# Patient Record
Sex: FEMALE | Race: WHITE | NOT HISPANIC OR LATINO
[De-identification: names, ages, dates, MRNs, and addresses within clinical notes are randomized per-mention and may not be internally consistent; named-entity substitution may affect disease eponyms.]

---

## 2018-08-26 ENCOUNTER — RESULT REVIEW (OUTPATIENT)
Age: 28
End: 2018-08-26

## 2019-05-13 ENCOUNTER — APPOINTMENT (OUTPATIENT)
Dept: INTERNAL MEDICINE | Facility: CLINIC | Age: 29
End: 2019-05-13
Payer: COMMERCIAL

## 2019-05-13 ENCOUNTER — TRANSCRIPTION ENCOUNTER (OUTPATIENT)
Age: 29
End: 2019-05-13

## 2019-05-13 VITALS
TEMPERATURE: 98.3 F | HEART RATE: 91 BPM | RESPIRATION RATE: 14 BRPM | BODY MASS INDEX: 21.17 KG/M2 | WEIGHT: 105 LBS | OXYGEN SATURATION: 99 % | DIASTOLIC BLOOD PRESSURE: 60 MMHG | SYSTOLIC BLOOD PRESSURE: 90 MMHG | HEIGHT: 59 IN

## 2019-05-13 DIAGNOSIS — Z78.9 OTHER SPECIFIED HEALTH STATUS: ICD-10-CM

## 2019-05-13 DIAGNOSIS — Z00.00 ENCOUNTER FOR GENERAL ADULT MEDICAL EXAMINATION W/OUT ABNORMAL FINDINGS: ICD-10-CM

## 2019-05-13 DIAGNOSIS — K58.9 IRRITABLE BOWEL SYNDROME W/OUT DIARRHEA: ICD-10-CM

## 2019-05-13 PROCEDURE — 36415 COLL VENOUS BLD VENIPUNCTURE: CPT

## 2019-05-13 PROCEDURE — 99385 PREV VISIT NEW AGE 18-39: CPT

## 2019-05-13 NOTE — HISTORY OF PRESENT ILLNESS
[de-identified] : Needs a PMD\par  Heartburn in the past year.   Saw GIDr Dial.took nexium for one year and now on zantac.  \par Works as a nurse.  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 \par Mother recently dsd with hodgkins\par Pap annually\par Takes melatonin nightly for sleep.  \par Had gardasil [FreeTextEntry1] : wellness

## 2019-05-14 LAB
ALBUMIN SERPL ELPH-MCNC: 4.5 G/DL
ALP BLD-CCNC: 50 U/L
ALT SERPL-CCNC: 18 U/L
ANION GAP SERPL CALC-SCNC: 13 MMOL/L
AST SERPL-CCNC: 18 U/L
BASOPHILS # BLD AUTO: 0.04 K/UL
BASOPHILS NFR BLD AUTO: 0.8 %
BILIRUB SERPL-MCNC: 0.6 MG/DL
BUN SERPL-MCNC: 12 MG/DL
CALCIUM SERPL-MCNC: 9.6 MG/DL
CHLORIDE SERPL-SCNC: 101 MMOL/L
CHOLEST SERPL-MCNC: 198 MG/DL
CHOLEST/HDLC SERPL: 3.2 RATIO
CO2 SERPL-SCNC: 23 MMOL/L
CREAT SERPL-MCNC: 0.75 MG/DL
EOSINOPHIL # BLD AUTO: 0.06 K/UL
EOSINOPHIL NFR BLD AUTO: 1.1 %
GLUCOSE SERPL-MCNC: 84 MG/DL
HCT VFR BLD CALC: 44.7 %
HDLC SERPL-MCNC: 62 MG/DL
HGB BLD-MCNC: 14.8 G/DL
IMM GRANULOCYTES NFR BLD AUTO: 0 %
LDLC SERPL CALC-MCNC: 119 MG/DL
LYMPHOCYTES # BLD AUTO: 1.61 K/UL
LYMPHOCYTES NFR BLD AUTO: 30.8 %
MAN DIFF?: NORMAL
MCHC RBC-ENTMCNC: 31.1 PG
MCHC RBC-ENTMCNC: 33.1 GM/DL
MCV RBC AUTO: 93.9 FL
MONOCYTES # BLD AUTO: 0.65 K/UL
MONOCYTES NFR BLD AUTO: 12.5 %
NEUTROPHILS # BLD AUTO: 2.86 K/UL
NEUTROPHILS NFR BLD AUTO: 54.8 %
PLATELET # BLD AUTO: 235 K/UL
POTASSIUM SERPL-SCNC: 4.4 MMOL/L
PROT SERPL-MCNC: 7.8 G/DL
RBC # BLD: 4.76 M/UL
RBC # FLD: 13.2 %
SODIUM SERPL-SCNC: 137 MMOL/L
TRIGL SERPL-MCNC: 83 MG/DL
WBC # FLD AUTO: 5.22 K/UL

## 2019-08-27 ENCOUNTER — RESULT REVIEW (OUTPATIENT)
Age: 29
End: 2019-08-27

## 2020-11-01 ENCOUNTER — RESULT REVIEW (OUTPATIENT)
Age: 30
End: 2020-11-01

## 2021-09-16 ENCOUNTER — NON-APPOINTMENT (OUTPATIENT)
Age: 31
End: 2021-09-16

## 2021-10-04 DIAGNOSIS — Z86.59 PERSONAL HISTORY OF OTHER MENTAL AND BEHAVIORAL DISORDERS: ICD-10-CM

## 2021-10-04 DIAGNOSIS — N39.0 URINARY TRACT INFECTION, SITE NOT SPECIFIED: ICD-10-CM

## 2021-10-04 DIAGNOSIS — Z83.49 FAMILY HISTORY OF OTHER ENDOCRINE, NUTRITIONAL AND METABOLIC DISEASES: ICD-10-CM

## 2021-10-04 DIAGNOSIS — R87.618 OTHER ABNORMAL CYTOLOGICAL FINDINGS ON SPECIMENS FROM CERVIX UTERI: ICD-10-CM

## 2021-10-04 DIAGNOSIS — Z80.7 FAMILY HISTORY OF OTHER MALIGNANT NEOPLASMS OF LYMPHOID, HEMATOPOIETIC AND RELATED TISSUES: ICD-10-CM

## 2021-10-04 DIAGNOSIS — E04.2 NONTOXIC MULTINODULAR GOITER: ICD-10-CM

## 2021-10-04 DIAGNOSIS — Z86.79 PERSONAL HISTORY OF OTHER DISEASES OF THE CIRCULATORY SYSTEM: ICD-10-CM

## 2021-10-04 DIAGNOSIS — Z87.19 PERSONAL HISTORY OF OTHER DISEASES OF THE DIGESTIVE SYSTEM: ICD-10-CM

## 2021-11-03 ENCOUNTER — RESULT CHARGE (OUTPATIENT)
Age: 31
End: 2021-11-03

## 2021-11-04 ENCOUNTER — NON-APPOINTMENT (OUTPATIENT)
Age: 31
End: 2021-11-04

## 2021-11-04 ENCOUNTER — APPOINTMENT (OUTPATIENT)
Dept: FAMILY MEDICINE | Facility: CLINIC | Age: 31
End: 2021-11-04
Payer: COMMERCIAL

## 2021-11-04 ENCOUNTER — LABORATORY RESULT (OUTPATIENT)
Age: 31
End: 2021-11-04

## 2021-11-04 VITALS
HEIGHT: 59 IN | SYSTOLIC BLOOD PRESSURE: 100 MMHG | HEART RATE: 87 BPM | TEMPERATURE: 96.4 F | DIASTOLIC BLOOD PRESSURE: 64 MMHG | BODY MASS INDEX: 22.18 KG/M2 | WEIGHT: 110 LBS | OXYGEN SATURATION: 98 %

## 2021-11-04 DIAGNOSIS — Z13.6 ENCOUNTER FOR SCREENING FOR CARDIOVASCULAR DISORDERS: ICD-10-CM

## 2021-11-04 DIAGNOSIS — Z00.00 ENCOUNTER FOR GENERAL ADULT MEDICAL EXAMINATION W/OUT ABNORMAL FINDINGS: ICD-10-CM

## 2021-11-04 DIAGNOSIS — R79.89 OTHER SPECIFIED ABNORMAL FINDINGS OF BLOOD CHEMISTRY: ICD-10-CM

## 2021-11-04 PROCEDURE — 99395 PREV VISIT EST AGE 18-39: CPT | Mod: 25

## 2021-11-04 PROCEDURE — 36415 COLL VENOUS BLD VENIPUNCTURE: CPT

## 2021-11-04 PROCEDURE — 93000 ELECTROCARDIOGRAM COMPLETE: CPT

## 2021-11-04 NOTE — HEALTH RISK ASSESSMENT
[Very Good] : ~his/her~  mood as very good [Yes] : Yes [2 - 4 times a month (2 pts)] : 2-4 times a month (2 points) [3 or 4 (1 pt)] : 3 or 4  (1 point) [Never (0 pts)] : Never (0 points) [0] : 2) Feeling down, depressed, or hopeless: Not at all (0) [With Family] : lives with family [Employed] : employed [Feels Safe at Home] : Feels safe at home [Reports normal functional visual acuity (ie: able to read med bottle)] : Reports normal functional visual acuity [Smoke Detector] : smoke detector [Carbon Monoxide Detector] : carbon monoxide detector [Safety elements used in home] : safety elements used in home [Seat Belt] :  uses seat belt [Sunscreen] : uses sunscreen [Patient reported PAP Smear was normal] : Patient reported PAP Smear was normal [# of Members in Household ___] :  household currently consist of [unfilled] member(s) [College] : College [Significant Other] : lives with significant other [] : No [de-identified] : active daily  [de-identified] : moderately healthy  [Sexually Active] : sexually active [Reports changes in hearing] : Reports no changes in hearing [Reports changes in vision] : Reports no changes in vision [Reports changes in dental health] : Reports no changes in dental health [PapSmearDate] : 12/2020 [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

## 2021-11-06 ENCOUNTER — TRANSCRIPTION ENCOUNTER (OUTPATIENT)
Age: 31
End: 2021-11-06

## 2021-11-06 LAB
25(OH)D3 SERPL-MCNC: 39.1 NG/ML
ALBUMIN SERPL ELPH-MCNC: 4.6 G/DL
ALP BLD-CCNC: 52 U/L
ALT SERPL-CCNC: 18 U/L
ANION GAP SERPL CALC-SCNC: 14 MMOL/L
AST SERPL-CCNC: 21 U/L
BASOPHILS # BLD AUTO: 0.04 K/UL
BASOPHILS NFR BLD AUTO: 0.9 %
BILIRUB SERPL-MCNC: 0.5 MG/DL
BUN SERPL-MCNC: 15 MG/DL
CALCIUM SERPL-MCNC: 9.6 MG/DL
CHLORIDE SERPL-SCNC: 103 MMOL/L
CHOLEST SERPL-MCNC: 210 MG/DL
CO2 SERPL-SCNC: 23 MMOL/L
CREAT SERPL-MCNC: 0.62 MG/DL
EOSINOPHIL # BLD AUTO: 0.06 K/UL
EOSINOPHIL NFR BLD AUTO: 1.3 %
ESTIMATED AVERAGE GLUCOSE: 82 MG/DL
FERRITIN SERPL-MCNC: 51 NG/ML
FOLATE SERPL-MCNC: 11.2 NG/ML
GLUCOSE SERPL-MCNC: 88 MG/DL
HBA1C MFR BLD HPLC: 4.5 %
HCT VFR BLD CALC: 45.3 %
HDLC SERPL-MCNC: 60 MG/DL
HGB BLD-MCNC: 15 G/DL
IMM GRANULOCYTES NFR BLD AUTO: 0.2 %
IRON SATN MFR SERPL: 19 %
IRON SERPL-MCNC: 77 UG/DL
LDLC SERPL CALC-MCNC: 139 MG/DL
LYMPHOCYTES # BLD AUTO: 1.51 K/UL
LYMPHOCYTES NFR BLD AUTO: 32.4 %
MAN DIFF?: NORMAL
MCHC RBC-ENTMCNC: 32.5 PG
MCHC RBC-ENTMCNC: 33.1 GM/DL
MCV RBC AUTO: 98.1 FL
MONOCYTES # BLD AUTO: 0.58 K/UL
MONOCYTES NFR BLD AUTO: 12.4 %
NEUTROPHILS # BLD AUTO: 2.46 K/UL
NEUTROPHILS NFR BLD AUTO: 52.8 %
NONHDLC SERPL-MCNC: 150 MG/DL
PLATELET # BLD AUTO: 244 K/UL
POTASSIUM SERPL-SCNC: 4.1 MMOL/L
PROT SERPL-MCNC: 7.4 G/DL
RBC # BLD: 4.62 M/UL
RBC # FLD: 12 %
SODIUM SERPL-SCNC: 140 MMOL/L
T3RU NFR SERPL: 1.1 TBI
TIBC SERPL-MCNC: 395 UG/DL
TRIGL SERPL-MCNC: 52 MG/DL
TSH SERPL-ACNC: 0.95 UIU/ML
UIBC SERPL-MCNC: 318 UG/DL
VIT B12 SERPL-MCNC: 471 PG/ML
WBC # FLD AUTO: 4.66 K/UL

## 2022-04-06 ENCOUNTER — RESULT REVIEW (OUTPATIENT)
Age: 32
End: 2022-04-06

## 2022-08-10 ENCOUNTER — FORM ENCOUNTER (OUTPATIENT)
Age: 32
End: 2022-08-10

## 2023-01-05 ENCOUNTER — APPOINTMENT (OUTPATIENT)
Dept: FAMILY MEDICINE | Facility: CLINIC | Age: 33
End: 2023-01-05
Payer: COMMERCIAL

## 2023-01-05 VITALS
DIASTOLIC BLOOD PRESSURE: 62 MMHG | OXYGEN SATURATION: 95 % | WEIGHT: 110 LBS | TEMPERATURE: 97.3 F | BODY MASS INDEX: 22.18 KG/M2 | HEART RATE: 127 BPM | HEIGHT: 59 IN | SYSTOLIC BLOOD PRESSURE: 102 MMHG

## 2023-01-05 DIAGNOSIS — R59.0 LOCALIZED ENLARGED LYMPH NODES: ICD-10-CM

## 2023-01-05 DIAGNOSIS — J02.9 ACUTE PHARYNGITIS, UNSPECIFIED: ICD-10-CM

## 2023-01-05 PROCEDURE — 99213 OFFICE O/P EST LOW 20 MIN: CPT

## 2023-01-05 NOTE — HISTORY OF PRESENT ILLNESS
[FreeTextEntry8] : Patient presents with a sore throat. Symptoms started last Monday: temp of 103.6, sore throat, maxillary sinusitis, headaches, and greenish/brown phlegm. She had gone to  on Tuesday and was tested for flu and COVID; both negative. She states that she had started feeling better until Thursday where her fever progressively worsened and she observed white spots on the back of her throat. Had gone back to  and tested negative for strep; given 40mg Prednisone which she affirms helped. Denies anyone sick around her recently, having any history of mono, and feeling better following taking Nyquil and Advil.

## 2023-01-05 NOTE — ASSESSMENT
[FreeTextEntry1] : Her symptoms are consistent with pharyngitis. The etiology of this is unclear. She reports a negative rapid strep test and negative throat culture. She has swollen glands and does not recall having Mono. She also has sinus congestion.

## 2023-01-05 NOTE — PLAN
[FreeTextEntry1] : Due to duration of symptoms, will prescribe Prednisone and Augmentin to cover pharyngitis and also sinusitis. Ordered blood work for Mono and will discuss further based on results. Informed to halt antibiotic use and to continue taking steroid if a positive mono result/rash results. \par \par \par \par

## 2023-01-05 NOTE — REVIEW OF SYSTEMS
[Sore Throat] : sore throat [Negative] : Heme/Lymph [Fever] : fever [Headache] : headache [FreeTextEntry4] : maxillary sinusitis, white spots on back of throat, greenish/brown phlegm

## 2023-01-05 NOTE — PHYSICAL EXAM
[Grossly Normal Strength/Tone] : grossly normal strength/tone [No Focal Deficits] : no focal deficits [Normal] : affect was normal and insight and judgment were intact [de-identified] : white spot on back left side of throat, left sided nasal inflammation [de-identified] : swollen glands

## 2023-01-06 ENCOUNTER — TRANSCRIPTION ENCOUNTER (OUTPATIENT)
Age: 33
End: 2023-01-06

## 2023-01-06 LAB
ALBUMIN SERPL ELPH-MCNC: 4.7 G/DL
ALP BLD-CCNC: 88 U/L
ALT SERPL-CCNC: 51 U/L
ANION GAP SERPL CALC-SCNC: 11 MMOL/L
AST SERPL-CCNC: 33 U/L
BASOPHILS # BLD AUTO: 0.06 K/UL
BASOPHILS NFR BLD AUTO: 0.5 %
BILIRUB SERPL-MCNC: 0.2 MG/DL
BUN SERPL-MCNC: 8 MG/DL
CALCIUM SERPL-MCNC: 9.7 MG/DL
CHLORIDE SERPL-SCNC: 100 MMOL/L
CO2 SERPL-SCNC: 29 MMOL/L
CREAT SERPL-MCNC: 0.57 MG/DL
EGFR: 125 ML/MIN/1.73M2
EOSINOPHIL # BLD AUTO: 0.03 K/UL
EOSINOPHIL NFR BLD AUTO: 0.3 %
GLUCOSE SERPL-MCNC: 100 MG/DL
HCT VFR BLD CALC: 43.2 %
HGB BLD-MCNC: 14.7 G/DL
IMM GRANULOCYTES NFR BLD AUTO: 0.5 %
LYMPHOCYTES # BLD AUTO: 1.66 K/UL
LYMPHOCYTES NFR BLD AUTO: 15.2 %
MAN DIFF?: NORMAL
MCHC RBC-ENTMCNC: 31.7 PG
MCHC RBC-ENTMCNC: 34 GM/DL
MCV RBC AUTO: 93.3 FL
MONOCYTES # BLD AUTO: 1.03 K/UL
MONOCYTES NFR BLD AUTO: 9.4 %
NEUTROPHILS # BLD AUTO: 8.08 K/UL
NEUTROPHILS NFR BLD AUTO: 74.1 %
PLATELET # BLD AUTO: 257 K/UL
POTASSIUM SERPL-SCNC: 3.8 MMOL/L
PROT SERPL-MCNC: 7.7 G/DL
RBC # BLD: 4.63 M/UL
RBC # FLD: 11.8 %
SODIUM SERPL-SCNC: 140 MMOL/L
WBC # FLD AUTO: 10.91 K/UL

## 2023-01-07 ENCOUNTER — TRANSCRIPTION ENCOUNTER (OUTPATIENT)
Age: 33
End: 2023-01-07

## 2023-01-07 LAB
CMV IGG SERPL QL: <0.2 U/ML
CMV IGG SERPL-IMP: NEGATIVE
CMV IGM SERPL QL: <8 AU/ML
CMV IGM SERPL QL: NEGATIVE
EBV EA AB SER IA-ACNC: 10.8 U/ML
EBV EA AB TITR SER IF: POSITIVE
EBV EA IGG SER QL IA: >600 U/ML
EBV EA IGG SER-ACNC: ABNORMAL
EBV EA IGM SER IA-ACNC: NEGATIVE
EBV PATRN SPEC IB-IMP: NORMAL
EBV VCA IGG SER IA-ACNC: 203 U/ML
EBV VCA IGM SER QL IA: 15.4 U/ML
EPSTEIN-BARR VIRUS CAPSID ANTIGEN IGG: POSITIVE
HETEROPH AB SER QL: NEGATIVE

## 2023-02-16 ENCOUNTER — APPOINTMENT (OUTPATIENT)
Dept: FAMILY MEDICINE | Facility: CLINIC | Age: 33
End: 2023-02-16

## 2023-03-09 ENCOUNTER — APPOINTMENT (OUTPATIENT)
Dept: FAMILY MEDICINE | Facility: CLINIC | Age: 33
End: 2023-03-09
Payer: COMMERCIAL

## 2023-03-09 VITALS
HEIGHT: 59 IN | BODY MASS INDEX: 23.99 KG/M2 | WEIGHT: 119 LBS | DIASTOLIC BLOOD PRESSURE: 60 MMHG | TEMPERATURE: 96 F | SYSTOLIC BLOOD PRESSURE: 108 MMHG | OXYGEN SATURATION: 97 % | HEART RATE: 95 BPM

## 2023-03-09 DIAGNOSIS — Z00.00 ENCOUNTER FOR GENERAL ADULT MEDICAL EXAMINATION W/OUT ABNORMAL FINDINGS: ICD-10-CM

## 2023-03-09 PROCEDURE — 36415 COLL VENOUS BLD VENIPUNCTURE: CPT

## 2023-03-09 PROCEDURE — 99395 PREV VISIT EST AGE 18-39: CPT | Mod: 25

## 2023-03-09 RX ORDER — NORETHINDRONE ACETATE AND ETHINYL ESTRADIOL 1; .02 MG/1; MG/1
1-20 TABLET ORAL
Refills: 0 | Status: DISCONTINUED | COMMUNITY
End: 2023-03-09

## 2023-03-09 RX ORDER — AMOXICILLIN AND CLAVULANATE POTASSIUM 875; 125 MG/1; MG/1
875-125 TABLET, COATED ORAL
Qty: 20 | Refills: 0 | Status: DISCONTINUED | COMMUNITY
Start: 2023-01-05 | End: 2023-03-09

## 2023-03-09 RX ORDER — PREDNISONE 20 MG/1
20 TABLET ORAL DAILY
Qty: 10 | Refills: 0 | Status: DISCONTINUED | COMMUNITY
Start: 2023-01-05 | End: 2023-03-09

## 2023-03-09 NOTE — HEALTH RISK ASSESSMENT
[No] : No [PHQ-2 Negative - No further assessment needed] : PHQ-2 Negative - No further assessment needed [] :  [Fully functional (bathing, dressing, toileting, transferring, walking, feeding)] : Fully functional (bathing, dressing, toileting, transferring, walking, feeding) [Fully functional (using the telephone, shopping, preparing meals, housekeeping, doing laundry, using] : Fully functional and needs no help or supervision to perform IADLs (using the telephone, shopping, preparing meals, housekeeping, doing laundry, using transportation, managing medications and managing finances) [Never] : Never [Employed] : employed [Graduate School] : graduate school [de-identified] : active [de-identified] : healthy [Reports changes in hearing] : Reports no changes in hearing [Reports changes in vision] : Reports no changes in vision [Reports changes in dental health] : Reports no changes in dental health [PapSmearComments] : GYN- Dr Dubois  [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

## 2023-03-09 NOTE — HISTORY OF PRESENT ILLNESS
[FreeTextEntry1] : CPE [de-identified] : 32 year F presents for annual physical exam.\par Feeling well with no complaints.\par \par

## 2023-03-10 LAB
ALBUMIN SERPL ELPH-MCNC: 4.9 G/DL
ALP BLD-CCNC: 80 U/L
ALT SERPL-CCNC: 23 U/L
ANION GAP SERPL CALC-SCNC: 13 MMOL/L
APPEARANCE: CLEAR
AST SERPL-CCNC: 20 U/L
BASOPHILS # BLD AUTO: 0.04 K/UL
BASOPHILS NFR BLD AUTO: 0.8 %
BILIRUB SERPL-MCNC: 0.6 MG/DL
BILIRUBIN URINE: NEGATIVE
BLOOD URINE: NEGATIVE
BUN SERPL-MCNC: 18 MG/DL
CALCIUM SERPL-MCNC: 9.8 MG/DL
CHLORIDE SERPL-SCNC: 99 MMOL/L
CHOLEST SERPL-MCNC: 202 MG/DL
CO2 SERPL-SCNC: 25 MMOL/L
COLOR: COLORLESS
CREAT SERPL-MCNC: 0.62 MG/DL
EGFR: 122 ML/MIN/1.73M2
EOSINOPHIL # BLD AUTO: 0.04 K/UL
EOSINOPHIL NFR BLD AUTO: 0.8 %
GLUCOSE QUALITATIVE U: NEGATIVE
GLUCOSE SERPL-MCNC: 91 MG/DL
HCT VFR BLD CALC: 43.9 %
HDLC SERPL-MCNC: 73 MG/DL
HGB BLD-MCNC: 15.1 G/DL
IMM GRANULOCYTES NFR BLD AUTO: 0.2 %
KETONES URINE: NEGATIVE
LDLC SERPL CALC-MCNC: 121 MG/DL
LEUKOCYTE ESTERASE URINE: NEGATIVE
LYMPHOCYTES # BLD AUTO: 1.6 K/UL
LYMPHOCYTES NFR BLD AUTO: 31.4 %
MAN DIFF?: NORMAL
MCHC RBC-ENTMCNC: 32 PG
MCHC RBC-ENTMCNC: 34.4 GM/DL
MCV RBC AUTO: 93 FL
MONOCYTES # BLD AUTO: 0.54 K/UL
MONOCYTES NFR BLD AUTO: 10.6 %
NEUTROPHILS # BLD AUTO: 2.87 K/UL
NEUTROPHILS NFR BLD AUTO: 56.2 %
NITRITE URINE: NEGATIVE
NONHDLC SERPL-MCNC: 129 MG/DL
PH URINE: 6
PLATELET # BLD AUTO: 209 K/UL
POTASSIUM SERPL-SCNC: 4.3 MMOL/L
PROT SERPL-MCNC: 7.5 G/DL
PROTEIN URINE: NEGATIVE
RBC # BLD: 4.72 M/UL
RBC # FLD: 12.3 %
SODIUM SERPL-SCNC: 137 MMOL/L
SPECIFIC GRAVITY URINE: 1.01
TRIGL SERPL-MCNC: 43 MG/DL
TSH SERPL-ACNC: 1.36 UIU/ML
UROBILINOGEN URINE: NORMAL
WBC # FLD AUTO: 5.1 K/UL

## 2023-03-15 ENCOUNTER — NON-APPOINTMENT (OUTPATIENT)
Age: 33
End: 2023-03-15

## 2023-05-31 ENCOUNTER — APPOINTMENT (OUTPATIENT)
Dept: OTOLARYNGOLOGY | Facility: CLINIC | Age: 33
End: 2023-05-31
Payer: COMMERCIAL

## 2023-05-31 DIAGNOSIS — J32.0 CHRONIC MAXILLARY SINUSITIS: ICD-10-CM

## 2023-05-31 DIAGNOSIS — K21.9 GASTRO-ESOPHAGEAL REFLUX DISEASE W/OUT ESOPHAGITIS: ICD-10-CM

## 2023-05-31 DIAGNOSIS — R07.0 PAIN IN THROAT: ICD-10-CM

## 2023-05-31 PROCEDURE — 99204 OFFICE O/P NEW MOD 45 MIN: CPT | Mod: 25

## 2023-05-31 PROCEDURE — 31231 NASAL ENDOSCOPY DX: CPT

## 2023-05-31 RX ORDER — OMEPRAZOLE 40 MG/1
40 CAPSULE, DELAYED RELEASE ORAL
Qty: 90 | Refills: 0 | Status: ACTIVE | COMMUNITY
Start: 2023-05-31 | End: 1900-01-01

## 2023-05-31 NOTE — HISTORY OF PRESENT ILLNESS
[de-identified] : c/o URI 3-4 mos ago - not COVID - likely viral.  Then noted exudate on tonsils.   Treated with augmentin and steroids by primary.   Now notes occ discomfort with swallowing on left side. ? concerned about tonsil issues.  Followed for thyroid by endocrinologist for thyroid nodules - last seen about 9 mos ago. On no thyroid meds. No problems eating and breathing.  No hemoptysis or sputum .  On flonase and has hx of GERD and hx of HH.  ? hard to follow reflux diet and also eats late at night   \par (Patient is an oncology nurse)

## 2023-05-31 NOTE — REASON FOR VISIT
[Subsequent Evaluation] : a subsequent evaluation for [Thyroid Problem] : thyroid problem [FreeTextEntry2] : pt complains of L throat swelling after after a few throat infections

## 2023-05-31 NOTE — ASSESSMENT
[FreeTextEntry1] : Patient with prior URI about 5-6 mos ago and now has some continued throat discomfort = does have hx of HH and GERD - exam today unremarkable - no sinus issues but does have findings of LPR - recommended reflux diet and omeprazole before breakfast.\par Also hx of thyroid nodules - is followed by endocrinologist - no nodules palpable - recommended patient return to endocrine for rescan of thyroid.  Followup in 3 mos .

## 2023-05-31 NOTE — REVIEW OF SYSTEMS
[Seasonal Allergies] : seasonal allergies [Post Nasal Drip] : post nasal drip [Swelling Neck] : swelling neck [FreeTextEntry7] : hx of GERD

## 2023-05-31 NOTE — PHYSICAL EXAM
[Nasal Endoscopy Performed] : nasal endoscopy was performed, see procedure section for findings [Midline] : trachea located in midline position [Normal] : no rashes [de-identified] : no significant adenopathy  [Laryngoscopy Performed] : laryngoscopy was performed, see procedure section for findings [de-identified] : 1+ cryptic tonsils - symmetric and normal to palpation

## 2023-08-29 ENCOUNTER — APPOINTMENT (OUTPATIENT)
Dept: OTOLARYNGOLOGY | Facility: CLINIC | Age: 33
End: 2023-08-29
Payer: COMMERCIAL

## 2023-08-29 VITALS — BODY MASS INDEX: 22.18 KG/M2 | WEIGHT: 110 LBS | HEIGHT: 59 IN

## 2023-08-29 DIAGNOSIS — J31.0 CHRONIC RHINITIS: ICD-10-CM

## 2023-08-29 DIAGNOSIS — E04.1 NONTOXIC SINGLE THYROID NODULE: ICD-10-CM

## 2023-08-29 DIAGNOSIS — K21.9 GASTRO-ESOPHAGEAL REFLUX DISEASE W/OUT ESOPHAGITIS: ICD-10-CM

## 2023-08-29 PROCEDURE — 99213 OFFICE O/P EST LOW 20 MIN: CPT

## 2023-08-29 NOTE — HISTORY OF PRESENT ILLNESS
[de-identified] : Here for followup of LPR - did not take meds since patient just found out pregnant.  Doing better watching diet.  No other complaints.  Does have mild URI but has mild congestion.  Patient did see endocrinology regarding f/u for her thyroid issues and was advised all stable.

## 2023-08-29 NOTE — REVIEW OF SYSTEMS
[Nasal Congestion] : nasal congestion [Discolored Nasal Discharge] : discolored nasal discharge [Negative] : Heme/Lymph

## 2023-08-29 NOTE — ASSESSMENT
[FreeTextEntry1] : Patient here for follow up of LPR - no evidence of infection - not on meds now as patient recently found out she is pregnant.   Also has had mild URI sx.  Exam unremarkable  - does have mild inferior turb hypertorphy - recommended continue reflux diet  - no meds recommended - continue care for thyroid nodule issues as per endocrinologist. Also can use saline nasal spray for nasal sx .  Follow up as necessary

## 2023-10-16 ENCOUNTER — NON-APPOINTMENT (OUTPATIENT)
Age: 33
End: 2023-10-16

## 2023-10-20 ENCOUNTER — APPOINTMENT (OUTPATIENT)
Dept: ANTEPARTUM | Facility: CLINIC | Age: 33
End: 2023-10-20
Payer: COMMERCIAL

## 2023-10-20 ENCOUNTER — ASOB RESULT (OUTPATIENT)
Age: 33
End: 2023-10-20

## 2023-10-20 PROCEDURE — 76801 OB US < 14 WKS SINGLE FETUS: CPT

## 2023-10-20 PROCEDURE — 76813 OB US NUCHAL MEAS 1 GEST: CPT | Mod: 59

## 2023-10-24 ENCOUNTER — APPOINTMENT (OUTPATIENT)
Dept: PEDIATRIC MEDICAL GENETICS | Facility: CLINIC | Age: 33
End: 2023-10-24
Payer: COMMERCIAL

## 2023-10-24 DIAGNOSIS — Z78.9 OTHER SPECIFIED HEALTH STATUS: ICD-10-CM

## 2023-10-24 DIAGNOSIS — Z31.5 ENCOUNTER FOR PROCREATIVE GENETIC COUNSELING: ICD-10-CM

## 2023-10-24 DIAGNOSIS — Z84.89 FAMILY HISTORY OF OTHER SPECIFIED CONDITIONS: ICD-10-CM

## 2023-10-24 DIAGNOSIS — Z82.69 FAMILY HISTORY OF OTHER DISEASES OF THE MUSCULOSKELETAL SYSTEM AND CONNECTIVE TISSUE: ICD-10-CM

## 2023-10-24 DIAGNOSIS — Z80.1 FAMILY HISTORY OF MALIGNANT NEOPLASM OF TRACHEA, BRONCHUS AND LUNG: ICD-10-CM

## 2023-10-24 DIAGNOSIS — Z14.8 GENETIC CARRIER OF OTHER DISEASE: ICD-10-CM

## 2023-10-24 DIAGNOSIS — Z82.49 FAMILY HISTORY OF ISCHEMIC HEART DISEASE AND OTHER DISEASES OF THE CIRCULATORY SYSTEM: ICD-10-CM

## 2023-10-24 PROCEDURE — 96040: CPT

## 2023-10-28 PROBLEM — Z82.69 FAMILY HISTORY OF SCOLIOSIS: Status: ACTIVE | Noted: 2023-10-28

## 2023-10-28 PROBLEM — Z31.5 ENCOUNTER FOR PROCREATIVE GENETIC COUNSELING: Status: ACTIVE | Noted: 2023-10-28

## 2023-10-28 PROBLEM — Z78.9 NON-SMOKER: Status: ACTIVE | Noted: 2023-10-28

## 2023-10-28 PROBLEM — Z80.1 FAMILY HISTORY OF LUNG CANCER: Status: ACTIVE | Noted: 2023-10-28

## 2023-10-28 PROBLEM — Z82.49 FAMILY HISTORY OF BRAIN ANEURYSM: Status: ACTIVE | Noted: 2023-10-28

## 2023-10-28 PROBLEM — Z84.89 FAMILY HISTORY OF GENETIC DISORDER: Status: ACTIVE | Noted: 2023-10-28

## 2023-10-28 PROBLEM — Z14.8 CARRIER OF SPINAL MUSCULAR ATROPHY: Status: ACTIVE | Noted: 2023-10-28

## 2023-11-13 ENCOUNTER — NON-APPOINTMENT (OUTPATIENT)
Age: 33
End: 2023-11-13

## 2023-12-11 ENCOUNTER — ASOB RESULT (OUTPATIENT)
Age: 33
End: 2023-12-11

## 2023-12-11 ENCOUNTER — APPOINTMENT (OUTPATIENT)
Dept: ANTEPARTUM | Facility: CLINIC | Age: 33
End: 2023-12-11
Payer: COMMERCIAL

## 2023-12-11 PROCEDURE — 76805 OB US >/= 14 WKS SNGL FETUS: CPT

## 2023-12-22 ENCOUNTER — APPOINTMENT (OUTPATIENT)
Dept: ULTRASOUND IMAGING | Facility: CLINIC | Age: 33
End: 2023-12-22
Payer: COMMERCIAL

## 2023-12-22 ENCOUNTER — OUTPATIENT (OUTPATIENT)
Dept: OUTPATIENT SERVICES | Facility: HOSPITAL | Age: 33
LOS: 1 days | End: 2023-12-22
Payer: COMMERCIAL

## 2023-12-22 DIAGNOSIS — Z00.8 ENCOUNTER FOR OTHER GENERAL EXAMINATION: ICD-10-CM

## 2023-12-22 PROCEDURE — 76705 ECHO EXAM OF ABDOMEN: CPT

## 2023-12-22 PROCEDURE — 76705 ECHO EXAM OF ABDOMEN: CPT | Mod: 26

## 2024-02-12 ENCOUNTER — APPOINTMENT (OUTPATIENT)
Dept: FAMILY MEDICINE | Facility: CLINIC | Age: 34
End: 2024-02-12
Payer: COMMERCIAL

## 2024-02-12 VITALS
TEMPERATURE: 97.2 F | HEIGHT: 59 IN | SYSTOLIC BLOOD PRESSURE: 102 MMHG | DIASTOLIC BLOOD PRESSURE: 72 MMHG | BODY MASS INDEX: 25.8 KG/M2 | HEART RATE: 113 BPM | OXYGEN SATURATION: 99 % | WEIGHT: 128 LBS

## 2024-02-12 DIAGNOSIS — J01.90 ACUTE SINUSITIS, UNSPECIFIED: ICD-10-CM

## 2024-02-12 PROCEDURE — 99213 OFFICE O/P EST LOW 20 MIN: CPT

## 2024-02-12 RX ORDER — ELASTIC BANDAGE 2"X2.2YD
BANDAGE TOPICAL
Refills: 0 | Status: ACTIVE | COMMUNITY

## 2024-02-12 RX ORDER — SIMETHICONE CHEW TAB 80 MG 80 MG
TABLET ORAL
Refills: 0 | Status: ACTIVE | COMMUNITY

## 2024-02-12 NOTE — ASSESSMENT
[FreeTextEntry1] : She has symptoms consistent with acute sinusitis. This is her third episode of similar symptoms in the past 6-7 weeks. Although the current symptoms have only been present for a few days, this is more likely a bacterial infection due to the frequency of recurrences. She was prescribed amoxicillin at Urgent Care because she is pregnant, though Augmentin would be more appropriate and shoudl also be safe in pregnancy.

## 2024-02-12 NOTE — HEALTH RISK ASSESSMENT
[0] : 2) Feeling down, depressed, or hopeless: Not at all (0) [PHQ-2 Negative - No further assessment needed] : PHQ-2 Negative - No further assessment needed [Never] : Never [[id number]] : 0

## 2024-02-12 NOTE — PHYSICAL EXAM
[Normal Outer Ear/Nose] : the outer ears and nose were normal in appearance [No Respiratory Distress] : no respiratory distress  [No Accessory Muscle Use] : no accessory muscle use [Normal] : affect was normal and insight and judgment were intact [Clear to Auscultation] : lungs were clear to auscultation bilaterally [de-identified] : congested nasal mucosa with purulent drainage

## 2024-02-12 NOTE — HISTORY OF PRESENT ILLNESS
[FreeTextEntry8] : Patient presents with URI symptoms. These started last Thursday with a fever, chills, aches, sinus pressure, etc. This is the third time she has been sick in about a month and a half.  She went to Urgent Care yesterday and was prescribed antibiotics for sinusitis after she tested negative for COVID and strep. She was prescribed amoxicillin 875 mg and has taken 2 doses so far.  She is 7 months pregnant.  She has had sinus issues since early pregnancy. She has postnasal drip.  One year ago she had a visit for a sore throat and swollen glands. She had been seen at Urgent Care then as well. She was treated with oral steroids and antibiotics (Augmentin).

## 2024-02-12 NOTE — PLAN
[FreeTextEntry1] : She will continue Amoxicillin as well as any other sympotmatic treatment allowed by her GYN. If she is not improving, consider a switch to Augmentin.  The majority of sinus infections are caused by viruses and respond to supportive care and do not require use of antibiotics. Recommend treating sinus symptoms with OTC medications including decongestants (Sudafed or Coricidin), mucolytics (Mucinex or Robitussin), nasal saline spray or Neti pot, and Tylenol or ibuprofen for pain and fever.   Also recommend use of nasal steroid sprays (i.e. Flonase). Afrin (OTC decongestant spray) used SPARINGLY (not for more than 2-3 days in a row) can help decrease nasal congestion and help sinuses to drain.    She will schedule follow up with ENT after she delivers so discuss if she is a candidate for a procedure to treat her sinuses.

## 2024-03-13 ENCOUNTER — APPOINTMENT (OUTPATIENT)
Dept: OTOLARYNGOLOGY | Facility: CLINIC | Age: 34
End: 2024-03-13

## 2024-03-20 ENCOUNTER — APPOINTMENT (OUTPATIENT)
Dept: OTOLARYNGOLOGY | Facility: CLINIC | Age: 34
End: 2024-03-20

## 2024-04-29 ENCOUNTER — INPATIENT (INPATIENT)
Facility: HOSPITAL | Age: 34
LOS: 2 days | Discharge: HOME | End: 2024-05-02
Attending: OBSTETRICS & GYNECOLOGY | Admitting: OBSTETRICS & GYNECOLOGY
Payer: COMMERCIAL

## 2024-04-29 ENCOUNTER — TRANSCRIPTION ENCOUNTER (OUTPATIENT)
Age: 34
End: 2024-04-29

## 2024-04-29 ENCOUNTER — RESULT REVIEW (OUTPATIENT)
Age: 34
End: 2024-04-29

## 2024-04-29 VITALS
TEMPERATURE: 98 F | DIASTOLIC BLOOD PRESSURE: 91 MMHG | HEART RATE: 102 BPM | SYSTOLIC BLOOD PRESSURE: 136 MMHG | RESPIRATION RATE: 16 BRPM

## 2024-04-29 LAB
ALBUMIN SERPL ELPH-MCNC: 1.5 G/DL — LOW (ref 3.3–5)
ALBUMIN SERPL ELPH-MCNC: 2.2 G/DL — LOW (ref 3.3–5)
ALP SERPL-CCNC: 134 U/L — HIGH (ref 40–120)
ALP SERPL-CCNC: 78 U/L — SIGNIFICANT CHANGE UP (ref 40–120)
ALT FLD-CCNC: 5 U/L — SIGNIFICANT CHANGE UP (ref 4–33)
ALT FLD-CCNC: 8 U/L — SIGNIFICANT CHANGE UP (ref 4–33)
ANION GAP SERPL CALC-SCNC: 14 MMOL/L — SIGNIFICANT CHANGE UP (ref 7–14)
ANION GAP SERPL CALC-SCNC: 17 MMOL/L — HIGH (ref 7–14)
APPEARANCE UR: ABNORMAL
APTT BLD: 24.6 SEC — SIGNIFICANT CHANGE UP (ref 24.5–35.6)
APTT BLD: 33.4 SEC — SIGNIFICANT CHANGE UP (ref 24.5–35.6)
APTT BLD: 36.6 SEC — HIGH (ref 24.5–35.6)
AST SERPL-CCNC: 16 U/L — SIGNIFICANT CHANGE UP (ref 4–32)
AST SERPL-CCNC: 31 U/L — SIGNIFICANT CHANGE UP (ref 4–32)
BASOPHILS # BLD AUTO: 0.04 K/UL — SIGNIFICANT CHANGE UP (ref 0–0.2)
BASOPHILS # BLD AUTO: 0.04 K/UL — SIGNIFICANT CHANGE UP (ref 0–0.2)
BASOPHILS NFR BLD AUTO: 0.2 % — SIGNIFICANT CHANGE UP (ref 0–2)
BASOPHILS NFR BLD AUTO: 0.4 % — SIGNIFICANT CHANGE UP (ref 0–2)
BILIRUB SERPL-MCNC: 0.2 MG/DL — SIGNIFICANT CHANGE UP (ref 0.2–1.2)
BILIRUB SERPL-MCNC: 0.5 MG/DL — SIGNIFICANT CHANGE UP (ref 0.2–1.2)
BILIRUB UR-MCNC: NEGATIVE — SIGNIFICANT CHANGE UP
BLD GP AB SCN SERPL QL: NEGATIVE — SIGNIFICANT CHANGE UP
BUN SERPL-MCNC: 6 MG/DL — LOW (ref 7–23)
BUN SERPL-MCNC: 9 MG/DL — SIGNIFICANT CHANGE UP (ref 7–23)
CALCIUM SERPL-MCNC: 6.5 MG/DL — CRITICAL LOW (ref 8.4–10.5)
CALCIUM SERPL-MCNC: 7.5 MG/DL — LOW (ref 8.4–10.5)
CHLORIDE SERPL-SCNC: 105 MMOL/L — SIGNIFICANT CHANGE UP (ref 98–107)
CHLORIDE SERPL-SCNC: 99 MMOL/L — SIGNIFICANT CHANGE UP (ref 98–107)
CO2 SERPL-SCNC: 12 MMOL/L — LOW (ref 22–31)
CO2 SERPL-SCNC: 20 MMOL/L — LOW (ref 22–31)
COLOR SPEC: YELLOW — SIGNIFICANT CHANGE UP
CREAT SERPL-MCNC: 0.33 MG/DL — LOW (ref 0.5–1.3)
CREAT SERPL-MCNC: 0.63 MG/DL — SIGNIFICANT CHANGE UP (ref 0.5–1.3)
DIFF PNL FLD: NEGATIVE — SIGNIFICANT CHANGE UP
EGFR: 121 ML/MIN/1.73M2 — SIGNIFICANT CHANGE UP
EGFR: 141 ML/MIN/1.73M2 — SIGNIFICANT CHANGE UP
EOSINOPHIL # BLD AUTO: 0 K/UL — SIGNIFICANT CHANGE UP (ref 0–0.5)
EOSINOPHIL # BLD AUTO: 0.09 K/UL — SIGNIFICANT CHANGE UP (ref 0–0.5)
EOSINOPHIL NFR BLD AUTO: 0 % — SIGNIFICANT CHANGE UP (ref 0–6)
EOSINOPHIL NFR BLD AUTO: 0.9 % — SIGNIFICANT CHANGE UP (ref 0–6)
FIBRINOGEN PPP-MCNC: 165 MG/DL — LOW (ref 200–465)
FIBRINOGEN PPP-MCNC: 206 MG/DL — SIGNIFICANT CHANGE UP (ref 200–465)
GLUCOSE BLDC GLUCOMTR-MCNC: 87 MG/DL — SIGNIFICANT CHANGE UP (ref 70–99)
GLUCOSE SERPL-MCNC: 231 MG/DL — HIGH (ref 70–99)
GLUCOSE SERPL-MCNC: 52 MG/DL — CRITICAL LOW (ref 70–99)
GLUCOSE UR QL: NEGATIVE MG/DL — SIGNIFICANT CHANGE UP
HCT VFR BLD CALC: 21.2 % — LOW (ref 34.5–45)
HCT VFR BLD CALC: 24.9 % — LOW (ref 34.5–45)
HCT VFR BLD CALC: 35 % — SIGNIFICANT CHANGE UP (ref 34.5–45)
HCT VFR BLD CALC: 38.9 % — SIGNIFICANT CHANGE UP (ref 34.5–45)
HGB BLD-MCNC: 12.1 G/DL — SIGNIFICANT CHANGE UP (ref 11.5–15.5)
HGB BLD-MCNC: 14.4 G/DL — SIGNIFICANT CHANGE UP (ref 11.5–15.5)
HGB BLD-MCNC: 7.6 G/DL — LOW (ref 11.5–15.5)
HGB BLD-MCNC: 8.7 G/DL — LOW (ref 11.5–15.5)
IANC: 13.23 K/UL — HIGH (ref 1.8–7.4)
IANC: 6.37 K/UL — SIGNIFICANT CHANGE UP (ref 1.8–7.4)
IMM GRANULOCYTES NFR BLD AUTO: 0.7 % — SIGNIFICANT CHANGE UP (ref 0–0.9)
IMM GRANULOCYTES NFR BLD AUTO: 1.6 % — HIGH (ref 0–0.9)
INR BLD: 0.95 RATIO — SIGNIFICANT CHANGE UP (ref 0.85–1.18)
INR BLD: 1.14 RATIO — SIGNIFICANT CHANGE UP (ref 0.85–1.18)
INR BLD: 1.22 RATIO — HIGH (ref 0.85–1.18)
KETONES UR-MCNC: NEGATIVE MG/DL — SIGNIFICANT CHANGE UP
LACTATE SERPL-SCNC: 1.3 MMOL/L — SIGNIFICANT CHANGE UP (ref 0.5–2)
LEUKOCYTE ESTERASE UR-ACNC: NEGATIVE — SIGNIFICANT CHANGE UP
LYMPHOCYTES # BLD AUTO: 1.37 K/UL — SIGNIFICANT CHANGE UP (ref 1–3.3)
LYMPHOCYTES # BLD AUTO: 2.3 K/UL — SIGNIFICANT CHANGE UP (ref 1–3.3)
LYMPHOCYTES # BLD AUTO: 22.7 % — SIGNIFICANT CHANGE UP (ref 13–44)
LYMPHOCYTES # BLD AUTO: 8.5 % — LOW (ref 13–44)
MAGNESIUM SERPL-MCNC: 0.8 MG/DL — CRITICAL LOW (ref 1.6–2.6)
MCHC RBC-ENTMCNC: 32.8 PG — SIGNIFICANT CHANGE UP (ref 27–34)
MCHC RBC-ENTMCNC: 33 PG — SIGNIFICANT CHANGE UP (ref 27–34)
MCHC RBC-ENTMCNC: 33.9 PG — SIGNIFICANT CHANGE UP (ref 27–34)
MCHC RBC-ENTMCNC: 34.2 PG — HIGH (ref 27–34)
MCHC RBC-ENTMCNC: 34.6 GM/DL — SIGNIFICANT CHANGE UP (ref 32–36)
MCHC RBC-ENTMCNC: 34.9 GM/DL — SIGNIFICANT CHANGE UP (ref 32–36)
MCHC RBC-ENTMCNC: 35.8 GM/DL — SIGNIFICANT CHANGE UP (ref 32–36)
MCHC RBC-ENTMCNC: 37 GM/DL — HIGH (ref 32–36)
MCV RBC AUTO: 92.4 FL — SIGNIFICANT CHANGE UP (ref 80–100)
MCV RBC AUTO: 94 FL — SIGNIFICANT CHANGE UP (ref 80–100)
MCV RBC AUTO: 94.6 FL — SIGNIFICANT CHANGE UP (ref 80–100)
MCV RBC AUTO: 95.4 FL — SIGNIFICANT CHANGE UP (ref 80–100)
MONOCYTES # BLD AUTO: 1.19 K/UL — HIGH (ref 0–0.9)
MONOCYTES # BLD AUTO: 1.36 K/UL — HIGH (ref 0–0.9)
MONOCYTES NFR BLD AUTO: 11.7 % — SIGNIFICANT CHANGE UP (ref 2–14)
MONOCYTES NFR BLD AUTO: 8.4 % — SIGNIFICANT CHANGE UP (ref 2–14)
NEUTROPHILS # BLD AUTO: 13.23 K/UL — HIGH (ref 1.8–7.4)
NEUTROPHILS # BLD AUTO: 6.37 K/UL — SIGNIFICANT CHANGE UP (ref 1.8–7.4)
NEUTROPHILS NFR BLD AUTO: 62.7 % — SIGNIFICANT CHANGE UP (ref 43–77)
NEUTROPHILS NFR BLD AUTO: 82.2 % — HIGH (ref 43–77)
NITRITE UR-MCNC: NEGATIVE — SIGNIFICANT CHANGE UP
NRBC # BLD: 0 /100 WBCS — SIGNIFICANT CHANGE UP (ref 0–0)
NRBC # FLD: 0 K/UL — SIGNIFICANT CHANGE UP (ref 0–0)
PH UR: 6.5 — SIGNIFICANT CHANGE UP (ref 5–8)
PHOSPHATE SERPL-MCNC: 1.7 MG/DL — LOW (ref 2.5–4.5)
PLATELET # BLD AUTO: 123 K/UL — LOW (ref 150–400)
PLATELET # BLD AUTO: 145 K/UL — LOW (ref 150–400)
PLATELET # BLD AUTO: 73 K/UL — LOW (ref 150–400)
PLATELET # BLD AUTO: 94 K/UL — LOW (ref 150–400)
POTASSIUM SERPL-MCNC: 3.5 MMOL/L — SIGNIFICANT CHANGE UP (ref 3.5–5.3)
POTASSIUM SERPL-MCNC: 3.8 MMOL/L — SIGNIFICANT CHANGE UP (ref 3.5–5.3)
POTASSIUM SERPL-SCNC: 3.5 MMOL/L — SIGNIFICANT CHANGE UP (ref 3.5–5.3)
POTASSIUM SERPL-SCNC: 3.8 MMOL/L — SIGNIFICANT CHANGE UP (ref 3.5–5.3)
PROT SERPL-MCNC: 2.5 G/DL — LOW (ref 6–8.3)
PROT SERPL-MCNC: 4.2 G/DL — LOW (ref 6–8.3)
PROT UR-MCNC: NEGATIVE MG/DL — SIGNIFICANT CHANGE UP
PROTHROM AB SERPL-ACNC: 10.7 SEC — SIGNIFICANT CHANGE UP (ref 9.5–13)
PROTHROM AB SERPL-ACNC: 12.7 SEC — SIGNIFICANT CHANGE UP (ref 9.5–13)
PROTHROM AB SERPL-ACNC: 13.6 SEC — HIGH (ref 9.5–13)
RBC # BLD: 2.24 M/UL — LOW (ref 3.8–5.2)
RBC # BLD: 2.65 M/UL — LOW (ref 3.8–5.2)
RBC # BLD: 3.67 M/UL — LOW (ref 3.8–5.2)
RBC # BLD: 4.21 M/UL — SIGNIFICANT CHANGE UP (ref 3.8–5.2)
RBC # FLD: 12.9 % — SIGNIFICANT CHANGE UP (ref 10.3–14.5)
RBC # FLD: 13 % — SIGNIFICANT CHANGE UP (ref 10.3–14.5)
RH IG SCN BLD-IMP: POSITIVE — SIGNIFICANT CHANGE UP
RH IG SCN BLD-IMP: POSITIVE — SIGNIFICANT CHANGE UP
SODIUM SERPL-SCNC: 133 MMOL/L — LOW (ref 135–145)
SODIUM SERPL-SCNC: 134 MMOL/L — LOW (ref 135–145)
SP GR SPEC: 1.02 — SIGNIFICANT CHANGE UP (ref 1–1.03)
T PALLIDUM AB TITR SER: NEGATIVE — SIGNIFICANT CHANGE UP
UROBILINOGEN FLD QL: 0.2 MG/DL — SIGNIFICANT CHANGE UP (ref 0.2–1)
WBC # BLD: 10.15 K/UL — SIGNIFICANT CHANGE UP (ref 3.8–10.5)
WBC # BLD: 14.53 K/UL — HIGH (ref 3.8–10.5)
WBC # BLD: 16.11 K/UL — HIGH (ref 3.8–10.5)
WBC # BLD: 19.09 K/UL — HIGH (ref 3.8–10.5)
WBC # FLD AUTO: 10.15 K/UL — SIGNIFICANT CHANGE UP (ref 3.8–10.5)
WBC # FLD AUTO: 14.53 K/UL — HIGH (ref 3.8–10.5)
WBC # FLD AUTO: 16.11 K/UL — HIGH (ref 3.8–10.5)
WBC # FLD AUTO: 19.09 K/UL — HIGH (ref 3.8–10.5)

## 2024-04-29 PROCEDURE — 88307 TISSUE EXAM BY PATHOLOGIST: CPT | Mod: 26

## 2024-04-29 PROCEDURE — 93010 ELECTROCARDIOGRAM REPORT: CPT

## 2024-04-29 PROCEDURE — 99254 IP/OBS CNSLTJ NEW/EST MOD 60: CPT | Mod: 25,GC

## 2024-04-29 RX ORDER — SODIUM CHLORIDE 9 MG/ML
1000 INJECTION, SOLUTION INTRAVENOUS
Refills: 0 | Status: DISCONTINUED | OUTPATIENT
Start: 2024-04-29 | End: 2024-04-29

## 2024-04-29 RX ORDER — BENZOCAINE 10 %
1 GEL (GRAM) MUCOUS MEMBRANE EVERY 6 HOURS
Refills: 0 | Status: DISCONTINUED | OUTPATIENT
Start: 2024-04-29 | End: 2024-05-02

## 2024-04-29 RX ORDER — DIPHENHYDRAMINE HCL 50 MG
25 CAPSULE ORAL EVERY 6 HOURS
Refills: 0 | Status: DISCONTINUED | OUTPATIENT
Start: 2024-04-29 | End: 2024-04-30

## 2024-04-29 RX ORDER — CEFAZOLIN SODIUM 1 G
2000 VIAL (EA) INJECTION ONCE
Refills: 0 | Status: COMPLETED | OUTPATIENT
Start: 2024-04-29 | End: 2024-04-29

## 2024-04-29 RX ORDER — LANOLIN
1 OINTMENT (GRAM) TOPICAL EVERY 6 HOURS
Refills: 0 | Status: DISCONTINUED | OUTPATIENT
Start: 2024-04-29 | End: 2024-05-02

## 2024-04-29 RX ORDER — AER TRAVELER 0.5 G/1
1 SOLUTION RECTAL; TOPICAL EVERY 4 HOURS
Refills: 0 | Status: DISCONTINUED | OUTPATIENT
Start: 2024-04-29 | End: 2024-05-02

## 2024-04-29 RX ORDER — CITRIC ACID/SODIUM CITRATE 300-500 MG
15 SOLUTION, ORAL ORAL EVERY 6 HOURS
Refills: 0 | Status: DISCONTINUED | OUTPATIENT
Start: 2024-04-29 | End: 2024-04-29

## 2024-04-29 RX ORDER — OXYTOCIN 10 UNIT/ML
41.67 VIAL (ML) INJECTION
Qty: 20 | Refills: 0 | Status: DISCONTINUED | OUTPATIENT
Start: 2024-04-29 | End: 2024-04-30

## 2024-04-29 RX ORDER — PIPERACILLIN AND TAZOBACTAM 4; .5 G/20ML; G/20ML
3.38 INJECTION, POWDER, LYOPHILIZED, FOR SOLUTION INTRAVENOUS EVERY 8 HOURS
Refills: 0 | Status: DISCONTINUED | OUTPATIENT
Start: 2024-04-30 | End: 2024-04-30

## 2024-04-29 RX ORDER — DIBUCAINE 1 %
1 OINTMENT (GRAM) RECTAL EVERY 6 HOURS
Refills: 0 | Status: DISCONTINUED | OUTPATIENT
Start: 2024-04-29 | End: 2024-05-02

## 2024-04-29 RX ORDER — HYDROCORTISONE 1 %
1 OINTMENT (GRAM) TOPICAL EVERY 6 HOURS
Refills: 0 | Status: DISCONTINUED | OUTPATIENT
Start: 2024-04-29 | End: 2024-05-02

## 2024-04-29 RX ORDER — ACETAMINOPHEN 500 MG
1000 TABLET ORAL ONCE
Refills: 0 | Status: COMPLETED | OUTPATIENT
Start: 2024-04-29 | End: 2024-04-29

## 2024-04-29 RX ORDER — OXYTOCIN 10 UNIT/ML
VIAL (ML) INJECTION
Qty: 30 | Refills: 0 | Status: DISCONTINUED | OUTPATIENT
Start: 2024-04-29 | End: 2024-04-29

## 2024-04-29 RX ORDER — OXYCODONE HYDROCHLORIDE 5 MG/1
5 TABLET ORAL
Refills: 0 | Status: DISCONTINUED | OUTPATIENT
Start: 2024-04-29 | End: 2024-05-02

## 2024-04-29 RX ORDER — OXYTOCIN 10 UNIT/ML
333.33 VIAL (ML) INJECTION
Qty: 20 | Refills: 0 | Status: DISCONTINUED | OUTPATIENT
Start: 2024-04-29 | End: 2024-04-30

## 2024-04-29 RX ORDER — OXYTOCIN 10 UNIT/ML
2 VIAL (ML) INJECTION
Qty: 30 | Refills: 0 | Status: DISCONTINUED | OUTPATIENT
Start: 2024-04-29 | End: 2024-04-29

## 2024-04-29 RX ORDER — MAGNESIUM HYDROXIDE 400 MG/1
30 TABLET, CHEWABLE ORAL
Refills: 0 | Status: DISCONTINUED | OUTPATIENT
Start: 2024-04-29 | End: 2024-05-02

## 2024-04-29 RX ORDER — SODIUM CHLORIDE 9 MG/ML
3 INJECTION INTRAMUSCULAR; INTRAVENOUS; SUBCUTANEOUS EVERY 8 HOURS
Refills: 0 | Status: DISCONTINUED | OUTPATIENT
Start: 2024-04-29 | End: 2024-05-02

## 2024-04-29 RX ORDER — SODIUM CHLORIDE 9 MG/ML
1000 INJECTION, SOLUTION INTRAVENOUS ONCE
Refills: 0 | Status: COMPLETED | OUTPATIENT
Start: 2024-04-29 | End: 2024-04-29

## 2024-04-29 RX ORDER — PIPERACILLIN AND TAZOBACTAM 4; .5 G/20ML; G/20ML
3.38 INJECTION, POWDER, LYOPHILIZED, FOR SOLUTION INTRAVENOUS ONCE
Refills: 0 | Status: COMPLETED | OUTPATIENT
Start: 2024-04-29 | End: 2024-04-29

## 2024-04-29 RX ORDER — PIPERACILLIN AND TAZOBACTAM 4; .5 G/20ML; G/20ML
3.38 INJECTION, POWDER, LYOPHILIZED, FOR SOLUTION INTRAVENOUS ONCE
Refills: 0 | Status: COMPLETED | OUTPATIENT
Start: 2024-04-30 | End: 2024-04-30

## 2024-04-29 RX ORDER — SIMETHICONE 80 MG/1
80 TABLET, CHEWABLE ORAL EVERY 4 HOURS
Refills: 0 | Status: DISCONTINUED | OUTPATIENT
Start: 2024-04-29 | End: 2024-05-02

## 2024-04-29 RX ORDER — SODIUM CHLORIDE 9 MG/ML
500 INJECTION, SOLUTION INTRAVENOUS ONCE
Refills: 0 | Status: COMPLETED | OUTPATIENT
Start: 2024-04-29 | End: 2024-04-29

## 2024-04-29 RX ORDER — PRAMOXINE HYDROCHLORIDE 150 MG/15G
1 AEROSOL, FOAM RECTAL EVERY 4 HOURS
Refills: 0 | Status: DISCONTINUED | OUTPATIENT
Start: 2024-04-29 | End: 2024-05-02

## 2024-04-29 RX ORDER — ACETAMINOPHEN 500 MG
975 TABLET ORAL
Refills: 0 | Status: DISCONTINUED | OUTPATIENT
Start: 2024-04-29 | End: 2024-05-02

## 2024-04-29 RX ORDER — OXYCODONE HYDROCHLORIDE 5 MG/1
5 TABLET ORAL ONCE
Refills: 0 | Status: DISCONTINUED | OUTPATIENT
Start: 2024-04-29 | End: 2024-05-02

## 2024-04-29 RX ORDER — KETOROLAC TROMETHAMINE 30 MG/ML
30 SYRINGE (ML) INJECTION ONCE
Refills: 0 | Status: DISCONTINUED | OUTPATIENT
Start: 2024-04-29 | End: 2024-04-29

## 2024-04-29 RX ORDER — OXYTOCIN 10 UNIT/ML
10 VIAL (ML) INJECTION ONCE
Refills: 0 | Status: COMPLETED | OUTPATIENT
Start: 2024-04-29 | End: 2024-04-29

## 2024-04-29 RX ORDER — IBUPROFEN 200 MG
600 TABLET ORAL EVERY 6 HOURS
Refills: 0 | Status: COMPLETED | OUTPATIENT
Start: 2024-04-29 | End: 2025-03-28

## 2024-04-29 RX ORDER — CHLORHEXIDINE GLUCONATE 213 G/1000ML
1 SOLUTION TOPICAL DAILY
Refills: 0 | Status: DISCONTINUED | OUTPATIENT
Start: 2024-04-29 | End: 2024-04-29

## 2024-04-29 RX ORDER — TETANUS TOXOID, REDUCED DIPHTHERIA TOXOID AND ACELLULAR PERTUSSIS VACCINE, ADSORBED 5; 2.5; 8; 8; 2.5 [IU]/.5ML; [IU]/.5ML; UG/.5ML; UG/.5ML; UG/.5ML
0.5 SUSPENSION INTRAMUSCULAR ONCE
Refills: 0 | Status: DISCONTINUED | OUTPATIENT
Start: 2024-04-29 | End: 2024-05-02

## 2024-04-29 RX ADMIN — Medication 975 MILLIGRAM(S): at 21:50

## 2024-04-29 RX ADMIN — SODIUM CHLORIDE 1000 MILLILITER(S): 9 INJECTION, SOLUTION INTRAVENOUS at 15:40

## 2024-04-29 RX ADMIN — Medication 30 MILLIGRAM(S): at 18:06

## 2024-04-29 RX ADMIN — Medication 100 MILLIGRAM(S): at 17:54

## 2024-04-29 RX ADMIN — PIPERACILLIN AND TAZOBACTAM 200 GRAM(S): 4; .5 INJECTION, POWDER, LYOPHILIZED, FOR SOLUTION INTRAVENOUS at 20:56

## 2024-04-29 RX ADMIN — Medication 1000 MILLIGRAM(S): at 19:10

## 2024-04-29 RX ADMIN — Medication 10 UNIT(S): at 17:40

## 2024-04-29 RX ADMIN — SODIUM CHLORIDE 125 MILLILITER(S): 9 INJECTION, SOLUTION INTRAVENOUS at 06:22

## 2024-04-29 RX ADMIN — Medication 125 MILLIUNIT(S)/MIN: at 17:40

## 2024-04-29 RX ADMIN — Medication 125 MILLIUNIT(S)/MIN: at 19:54

## 2024-04-29 RX ADMIN — SODIUM CHLORIDE 1000 MILLILITER(S): 9 INJECTION, SOLUTION INTRAVENOUS at 17:50

## 2024-04-29 RX ADMIN — Medication 2 MILLIUNIT(S)/MIN: at 11:56

## 2024-04-29 RX ADMIN — Medication 975 MILLIGRAM(S): at 22:20

## 2024-04-29 RX ADMIN — SODIUM CHLORIDE 1000 MILLILITER(S): 9 INJECTION, SOLUTION INTRAVENOUS at 18:00

## 2024-04-29 RX ADMIN — CHLORHEXIDINE GLUCONATE 1 APPLICATION(S): 213 SOLUTION TOPICAL at 06:32

## 2024-04-29 RX ADMIN — SODIUM CHLORIDE 3 MILLILITER(S): 9 INJECTION INTRAMUSCULAR; INTRAVENOUS; SUBCUTANEOUS at 23:25

## 2024-04-29 RX ADMIN — Medication 30 MILLIGRAM(S): at 19:10

## 2024-04-29 RX ADMIN — Medication 400 MILLIGRAM(S): at 18:00

## 2024-04-29 NOTE — OB PROVIDER H&P - NSICDXPASTMEDICALHX_GEN_ALL_CORE_FT
PAST MEDICAL HISTORY:  GERD (gastroesophageal reflux disease)     Hiatal hernia     IBS (irritable bowel syndrome)

## 2024-04-29 NOTE — OB PROVIDER H&P - NSHPPHYSICALEXAM_GEN_ALL_CORE
Objective  T(C): 36.8 (04-29-24 @ 05:27), Max: 36.8 (04-29-24 @ 05:27)  HR: 102 (04-29-24 @ 05:27) (102 - 102)  BP: 136/91 (04-29-24 @ 05:27) (136/91 - 136/91)  RR: 16 (04-29-24 @ 05:27) (16 - 16)  SpO2: --    PE:   CV: RRR  Pulm: breathing comfortably on RA  Abd: gravid, nontender  Extr: moving all extremities with ease  VE: 1.5/50/-3  FHT: baseline 140, mod variability, +accels, -decels  [place]: none  Sono: vertex

## 2024-04-29 NOTE — CONSULT NOTE ADULT - ASSESSMENT
33y  Female presents for elective IOL. She is now s/p vaginal delivery w/ 1L EBL and manual delivery of non-intact placenta. Hemostasis achieved, 2nd degree laceration repaired and Ancef initiated for manual extraction. Patient subsequently became tachycardic to the 170s and febrile to 38.3. RRT called for c/f septic shock. SICU consulted for hemodynamic monitoring.     ASSESSMENT: 33yFemale     PLAN:   Neurologic:    Respiratory:    Cardiovascular:    Gastrointestinal/Nutrition:    Genitourinary/Renal:    Hematologic:    Infectious Disease:    Endocrine:    Disposition: 33y  Female presents for elective IOL. She is now s/p vaginal delivery w/ 1L EBL and manual delivery of non-intact placenta. Hemostasis achieved, 2nd degree laceration repaired and Ancef initiated for manual extraction. Patient subsequently became tachycardic to the 170s and febrile to 38.3. RRT called for c/f septic shock. SICU consulted for hemodynamic monitoring.     PLAN:   Neurologic:  - AOx4  - pain control: tylenol & motrin, oxy prn  - Benadryl prn for pruritis    Respiratory:  - Maintain >96%   - on room air    Cardiovascular:  - MAP >65  - Monitor tachycardia    Gastrointestinal/Nutrition:  - Diet: regular  - simethicone prn  - Bowel Reg: magnesium PO prn    Genitourinary/Renal:  - hyatt  - monitor and replete electrolytes  - f/u urinalysis    Hematologic:  - hold dvt ppx    Infectious Disease:  - Zosyn (-  - F/u blood cx  - ancef x1  - Monitor WBC and fever    Endocrine:  - oxytocin gtt    Disposition: SICU  Code: FULL CODE 33y  Female presents for elective IOL. She is now s/p vaginal delivery w/ 1L EBL and manual delivery of non-intact placenta. Hemostasis achieved, 2nd degree laceration repaired and Ancef initiated for manual extraction. Patient subsequently became tachycardic to the 170s and febrile to 38.3. RRT called for c/f septic shock. SICU consulted for hemodynamic management and infectious workup.     PLAN:   Neurologic:  - AOx4  - pain control: tylenol & motrin, oxy prn  - Benadryl prn for pruritis    Respiratory:  - Maintain >96%   - on room air    Cardiovascular:  - MAP >65  - Monitor tachycardia    Gastrointestinal/Nutrition:  - Diet: regular  - simethicone prn  - Bowel Reg: magnesium PO prn    Genitourinary/Renal:  - hyatt  - monitor and replete electrolytes  - f/u urinalysis    Hematologic:  - hold dvt ppx    Infectious Disease:  - Zosyn (-  - F/u blood cx  - ancef x1  - Monitor WBC and fever    Endocrine:  - oxytocin gtt    Disposition: SICU  Code: FULL CODE

## 2024-04-29 NOTE — OB PROVIDER DELIVERY SUMMARY - NSPROVIDERDELIVERYNOTE_OBGYN_ALL_OB_FT
Pt fully dilated and pushing.  Prior to delivery maternal pulse 130s, maternal temp 37.7 rectally.  Delivered viable infant over intact perineum.  Nose and mouth bulb suctioned.  Infant handed to mother.  Cord clamped and cut after delay. Cord gases sent.  Placenta retained >30min after delivery.  Bladder drained with straight cath, 200ml urine.  Manual removal of non-intact placenta.    Bedside sono performed and additional membranes and small portion of placenta removed manually.  After delivery of placenta bedside sono with thin endometrial echo and no signs of retained portion of placenta.  Following delivery of placenta EBL noted to be 900ml.  Extra pitocin added to IVF, rectal cytotec given.  Bimanual massage with resultant improved uterine tone.    Ancef started for manual extraction.  2nd IV placed and labs sent.  IVF bolus started.  2nd degree laceration repaired with excellent hemostasis and restoration of anatomy.  Fundus firm.  Vault empty.  After delivery, maternal tachycardia to 150s-170s.  maternal temp 38.3.  Maternal sepsis.  RRT called.

## 2024-04-29 NOTE — CONSULT NOTE ADULT - ATTENDING COMMENTS
concern for severe sepsis given fever, tachycardia and ob history  -empiric antibiotic therapy  -pending culture results  -judicious fluid resuscitation  -chemical VTE prophylaxis to start in am if no e/o ongoing bleeding

## 2024-04-29 NOTE — CONSULT NOTE ADULT - SUBJECTIVE AND OBJECTIVE BOX
HISTORY OF PRESENT ILLNESS:  MACHO CROSS is a 33y [id number]     PAST MEDICAL HISTORY:   GERD (gastroesophageal reflux disease)    IBS (irritable bowel syndrome)    Hiatal hernia    GynHx: h/o chlamydia as a teenager s/p treatment, denies h/o fibroids, abnormal cervical exams      PAST SURGICAL HISTORY:   No significant past surgical history      FAMILY HISTORY:   mother is genetic carrier for SMA,    SOCIAL HISTORY:  – Psych: denies h/o anxiety/depression  – Social: denies h/o toxic habits in pregnancy  CODE STATUS:     HOME MEDICATIONS:    ALLERGIES: No Known Allergies      VITAL SIGNS:  ICU Vital Signs Last 24 Hrs  T(C): 38.3 (29 Apr 2024 18:04), Max: 38.3 (29 Apr 2024 18:04)  T(F): 100.9 (29 Apr 2024 18:04), Max: 100.9 (29 Apr 2024 18:04)  HR: 142 (29 Apr 2024 19:25) (95 - 184)  BP: 105/55 (29 Apr 2024 19:25) (75/39 - 204/154)  RR: 24 (29 Apr 2024 19:00) (15 - 26)  SpO2: 99% (29 Apr 2024 19:23) (81% - 100%)        NEURO  Exam:  Meds:acetaminophen     Tablet .. 975 milliGRAM(s) Oral <User Schedule>  diphenhydrAMINE 25 milliGRAM(s) Oral every 6 hours PRN Pruritus  ibuprofen  Tablet. 600 milliGRAM(s) Oral every 6 hours  oxyCODONE    IR 5 milliGRAM(s) Oral every 3 hours PRN Moderate to Severe Pain (4-10)  oxyCODONE    IR 5 milliGRAM(s) Oral once PRN Moderate to Severe Pain (4-10)      RESPIRATORY  Mechanical Ventilation:     Exam: unlabored respirations, CTA b/l. No wheezing, rales, rhonchi appreciated.   Meds:    CARDIOVASCULAR    Exam: S1S2. No murmurs, rubs, gallops appreciated.   Cardiac Rhythm: NSR.  Meds:    GI/NUTRITION  Exam: Soft, non distended, non-tender.  Diet:  Meds:magnesium hydroxide Suspension 30 milliLiter(s) Oral two times a day PRN Constipation  simethicone 80 milliGRAM(s) Chew every 4 hours PRN Gas      GENITOURINARY/RENAL  Meds:citric acid/sodium citrate Solution 15 milliLiter(s) Oral every 6 hours  lactated ringers Bolus 500 milliLiter(s) IV Bolus once  lactated ringers Bolus 1000 milliLiter(s) IV Bolus once  lactated ringers Bolus 1000 milliLiter(s) IV Bolus once  lactated ringers. 1000 milliLiter(s) IV Continuous <Continuous>  oxytocin Infusion 41.667 milliUNIT(s)/Min IV Continuous <Continuous>  oxytocin Infusion 333.333 milliUNIT(s)/Min IV Continuous <Continuous>  oxytocin Infusion.  milliUNIT(s)/Min IV Continuous <Continuous>  prenatal multivitamin 1 Tablet(s) Oral daily  sodium chloride 0.9% lock flush 3 milliLiter(s) IV Push every 8 hours      04-28 @ 07:01  -  04-29 @ 07:00  --------------------------------------------------------  IN:    Lactated Ringers: 250 mL  Total IN: 250 mL    OUT:  Total OUT: 0 mL    Total NET: 250 mL      04-29 @ 07:01 - 04-29 @ 19:45  --------------------------------------------------------  IN:    Lactated Ringers: 1750 mL    Oxytocin.: 500 mL  Total IN: 2250 mL    OUT:    Blood Loss (mL): 1092 mL    Indwelling Catheter - Urethral (mL): 700 mL  Total OUT: 1792 mL    Total NET: 458 mL      Weight (kg): 62.1 (04-29 @ 05:28)      [ x] Mcintyre catheter, indication: urine output monitoring in critically ill patient    HEMATOLOGIC  [ x] VTE Prophylaxis:                          12.1   19.09 )-----------( 123      ( 29 Apr 2024 17:34 )             35.0     PT/INR - ( 29 Apr 2024 17:34 )   PT: 10.7 sec;   INR: 0.95 ratio    PTT - ( 29 Apr 2024 17:34 )  PTT:24.6 sec  Transfusion: [ ] PRBC	[ ] Platelets	[ ] FFP	[ ] Cryoprecipitate      INFECTIOUS DISEASES  Meds:diphtheria/tetanus/pertussis (acellular) Vaccine (Adacel) 0.5 milliLiter(s) IntraMuscular once    RECENT CULTURES:      ENDOCRINE  Meds:  CAPILLARY BLOOD GLUCOSE      PATIENT CARE ACCESS DEVICES:  [ ] Peripheral IV  [ ] Central Venous Line	[ ] R	[ ] L	[ ] IJ	[ ] Fem	[ ] SC	Placed:   [ ] Arterial Line		[ ] R	[ ] L	[ ] Fem	[ ] Rad	[ ] Ax	Placed:   [ ] PICC:					[ ] Mediport  [ ] Urinary Catheter, Date Placed:   [x] Necessity of urinary, arterial, and venous catheters discussed    OTHER MEDICATIONS: benzocaine 20%/menthol 0.5% Spray 1 Spray(s) Topical every 6 hours PRN  chlorhexidine 2% Cloths 1 Application(s) Topical daily  dibucaine 1% Ointment 1 Application(s) Topical every 6 hours PRN  hydrocortisone 1% Cream 1 Application(s) Topical every 6 hours PRN  lanolin Ointment 1 Application(s) Topical every 6 hours PRN  pramoxine 1%/zinc 5% Cream 1 Application(s) Topical every 4 hours PRN  witch hazel Pads 1 Application(s) Topical every 4 hours PRN      IMAGING STUDIES: HISTORY OF PRESENT ILLNESS:  MACHO CROSS is a 33y  Female presents for elective IOL. She is now s/p vaginal delivery w/ 1L EBL and manual delivery of non-intact placenta. Hemostasis achieved, 2nd degree laceration repaired and Ancef initiated for manual extraction. Patient subsequently became tachycardic to the 170s and febrile to 38.3. RRT called for c/f septic shock. SICU consulted for hemodynamic monitoring. Blood cultures and urine cultures drawn, 1L bolus, and zosyn initiated.    PAST MEDICAL HISTORY:   GERD (gastroesophageal reflux disease)    IBS (irritable bowel syndrome)    Hiatal hernia    GynHx: h/o chlamydia as a teenager s/p treatment, denies h/o fibroids, abnormal cervical exams      PAST SURGICAL HISTORY:   No significant past surgical history      FAMILY HISTORY:   mother is genetic carrier for SMA,    SOCIAL HISTORY:  – Psych: denies h/o anxiety/depression  – Social: denies h/o toxic habits in pregnancy  CODE STATUS:     HOME MEDICATIONS:    ALLERGIES: No Known Allergies      VITAL SIGNS:  ICU Vital Signs Last 24 Hrs  T(C): 38.3 (2024 18:04), Max: 38.3 (2024 18:04)  T(F): 100.9 (2024 18:04), Max: 100.9 (2024 18:04)  HR: 142 (2024 19:25) (95 - 184)  BP: 105/55 (2024 19:25) (75/39 - 204/154)  RR: 24 (2024 19:00) (15 - 26)  SpO2: 99% (2024 19:23) (81% - 100%)        NEURO  Exam:  Meds:acetaminophen     Tablet .. 975 milliGRAM(s) Oral <User Schedule>  diphenhydrAMINE 25 milliGRAM(s) Oral every 6 hours PRN Pruritus  ibuprofen  Tablet. 600 milliGRAM(s) Oral every 6 hours  oxyCODONE    IR 5 milliGRAM(s) Oral every 3 hours PRN Moderate to Severe Pain (4-10)  oxyCODONE    IR 5 milliGRAM(s) Oral once PRN Moderate to Severe Pain (4-10)      RESPIRATORY  Mechanical Ventilation:     Exam: unlabored respirations, CTA b/l. No wheezing, rales, rhonchi appreciated.   Meds:    CARDIOVASCULAR    Exam: S1S2. No murmurs, rubs, gallops appreciated.   Cardiac Rhythm: NSR.  Meds:    GI/NUTRITION  Exam: Soft, non distended, non-tender.  Diet:  Meds:magnesium hydroxide Suspension 30 milliLiter(s) Oral two times a day PRN Constipation  simethicone 80 milliGRAM(s) Chew every 4 hours PRN Gas      GENITOURINARY/RENAL  Meds:citric acid/sodium citrate Solution 15 milliLiter(s) Oral every 6 hours  lactated ringers Bolus 500 milliLiter(s) IV Bolus once  lactated ringers Bolus 1000 milliLiter(s) IV Bolus once  lactated ringers Bolus 1000 milliLiter(s) IV Bolus once  lactated ringers. 1000 milliLiter(s) IV Continuous <Continuous>  oxytocin Infusion 41.667 milliUNIT(s)/Min IV Continuous <Continuous>  oxytocin Infusion 333.333 milliUNIT(s)/Min IV Continuous <Continuous>  oxytocin Infusion.  milliUNIT(s)/Min IV Continuous <Continuous>  prenatal multivitamin 1 Tablet(s) Oral daily  sodium chloride 0.9% lock flush 3 milliLiter(s) IV Push every 8 hours       @ 07: @ 07:00  --------------------------------------------------------  IN:    Lactated Ringers: 250 mL  Total IN: 250 mL    OUT:  Total OUT: 0 mL    Total NET: 250 mL       @ 07: @ 19:45  --------------------------------------------------------  IN:    Lactated Ringers: 1750 mL    Oxytocin.: 500 mL  Total IN: 2250 mL    OUT:    Blood Loss (mL): 1092 mL    Indwelling Catheter - Urethral (mL): 700 mL  Total OUT: 1792 mL    Total NET: 458 mL      Weight (kg): 62.1 ( @ 05:28)      [ x] Mcintyre catheter, indication: urine output monitoring in critically ill patient    HEMATOLOGIC  [ x] VTE Prophylaxis:                          12.1   19.09 )-----------( 123      ( 2024 17:34 )             35.0     PT/INR - ( 2024 17:34 )   PT: 10.7 sec;   INR: 0.95 ratio    PTT - ( 2024 17:34 )  PTT:24.6 sec  Transfusion: [ ] PRBC	[ ] Platelets	[ ] FFP	[ ] Cryoprecipitate      INFECTIOUS DISEASES  Meds:diphtheria/tetanus/pertussis (acellular) Vaccine (Adacel) 0.5 milliLiter(s) IntraMuscular once    RECENT CULTURES:      ENDOCRINE  Meds:  CAPILLARY BLOOD GLUCOSE      PATIENT CARE ACCESS DEVICES:  [ ] Peripheral IV  [ ] Central Venous Line	[ ] R	[ ] L	[ ] IJ	[ ] Fem	[ ] SC	Placed:   [ ] Arterial Line		[ ] R	[ ] L	[ ] Fem	[ ] Rad	[ ] Ax	Placed:   [ ] PICC:					[ ] Mediport  [ ] Urinary Catheter, Date Placed:   [x] Necessity of urinary, arterial, and venous catheters discussed    OTHER MEDICATIONS: benzocaine 20%/menthol 0.5% Spray 1 Spray(s) Topical every 6 hours PRN  chlorhexidine 2% Cloths 1 Application(s) Topical daily  dibucaine 1% Ointment 1 Application(s) Topical every 6 hours PRN  hydrocortisone 1% Cream 1 Application(s) Topical every 6 hours PRN  lanolin Ointment 1 Application(s) Topical every 6 hours PRN  pramoxine 1%/zinc 5% Cream 1 Application(s) Topical every 4 hours PRN  witch hazel Pads 1 Application(s) Topical every 4 hours PRN      IMAGING STUDIES: HISTORY OF PRESENT ILLNESS:  MACHO CROSS is a 33y  Female presents for elective IOL. She is now s/p vaginal delivery w/ 1L EBL and manual delivery of non-intact placenta. Hemostasis achieved, 2nd degree laceration repaired and Ancef initiated for manual extraction. Patient subsequently became tachycardic to the 170s and febrile to 38.3. RRT called for c/f septic shock. SICU consulted for hemodynamic monitoring. Blood cultures and urine cultures drawn, 1L bolus, and zosyn initiated.    PAST MEDICAL HISTORY:   GERD (gastroesophageal reflux disease)    IBS (irritable bowel syndrome)    Hiatal hernia    GynHx: h/o chlamydia as a teenager s/p treatment, denies h/o fibroids, abnormal cervical exams      PAST SURGICAL HISTORY:   No significant past surgical history      FAMILY HISTORY:   mother is genetic carrier for SMA,    SOCIAL HISTORY:  – Psych: denies h/o anxiety/depression  – Social: denies h/o toxic habits in pregnancy  CODE STATUS:     HOME MEDICATIONS:    ALLERGIES: No Known Allergies      VITAL SIGNS:  ICU Vital Signs Last 24 Hrs  T(C): 38.3 (2024 18:04), Max: 38.3 (2024 18:04)  T(F): 100.9 (2024 18:04), Max: 100.9 (2024 18:04)  HR: 142 (2024 19:25) (95 - 184)  BP: 105/55 (2024 19:25) (75/39 - 204/154)  RR: 24 (2024 19:00) (15 - 26)  SpO2: 99% (2024 19:23) (81% - 100%)      NEURO  Exam: AOx4, shivering, following commands, No CNS deficits  Meds:acetaminophen     Tablet .. 975 milliGRAM(s) Oral <User Schedule>  diphenhydrAMINE 25 milliGRAM(s) Oral every 6 hours PRN Pruritus  ibuprofen  Tablet. 600 milliGRAM(s) Oral every 6 hours  oxyCODONE    IR 5 milliGRAM(s) Oral every 3 hours PRN Moderate to Severe Pain (4-10)  oxyCODONE    IR 5 milliGRAM(s) Oral once PRN Moderate to Severe Pain (4-10)      RESPIRATORY  Exam: unlabored respirations, CTA b/l. No wheezing, rales, rhonchi appreciated.     CARDIOVASCULAR  Exam: S1S2. No murmurs, rubs, gallops appreciated.   Cardiac Rhythm: NSR.    GI/NUTRITION  Exam: Soft, non-distended, non-tender.  Diet: regular  Meds:magnesium hydroxide Suspension 30 milliLiter(s) Oral two times a day PRN Constipation  simethicone 80 milliGRAM(s) Chew every 4 hours PRN Gas      GENITOURINARY/RENAL  Meds:citric acid/sodium citrate Solution 15 milliLiter(s) Oral every 6 hours  lactated ringers Bolus 500 milliLiter(s) IV Bolus once  lactated ringers Bolus 1000 milliLiter(s) IV Bolus once  lactated ringers Bolus 1000 milliLiter(s) IV Bolus once  lactated ringers. 1000 milliLiter(s) IV Continuous <Continuous>  oxytocin Infusion 41.667 milliUNIT(s)/Min IV Continuous <Continuous>  oxytocin Infusion 333.333 milliUNIT(s)/Min IV Continuous <Continuous>  oxytocin Infusion.  milliUNIT(s)/Min IV Continuous <Continuous>  prenatal multivitamin 1 Tablet(s) Oral daily  sodium chloride 0.9% lock flush 3 milliLiter(s) IV Push every 8 hours       @ 07: @ 07:00  --------------------------------------------------------  IN:    Lactated Ringers: 250 mL  Total IN: 250 mL    OUT:  Total OUT: 0 mL    Total NET: 250 mL       @ 07: @ 19:45  --------------------------------------------------------  IN:    Lactated Ringers: 1750 mL    Oxytocin.: 500 mL  Total IN: 2250 mL    OUT:    Blood Loss (mL): 1092 mL    Indwelling Catheter - Urethral (mL): 700 mL  Total OUT: 1792 mL    Total NET: 458 mL    Weight (kg): 62.1 ( @ 05:28)  [ x] Mcintyre catheter, indication: urine output monitoring in critically ill patient    HEMATOLOGIC  [ x] VTE Prophylaxis:               12.1   19.09 )-----------( 123      ( 2024 17:34 )             35.0     PT/INR - ( 2024 17:34 )   PT: 10.7 sec;   INR: 0.95 ratio    PTT - ( 2024 17:34 )  PTT:24.6 sec  Transfusion: [ ] PRBC	[ ] Platelets	[ ] FFP	[ ] Cryoprecipitate    INFECTIOUS DISEASES  Meds:diphtheria/tetanus/pertussis (acellular) Vaccine (Adacel) 0.5 milliLiter(s) IntraMuscular once    RECENT CULTURES:  Urine and Blood Cultures pending    ENDOCRINE  Meds:  CAPILLARY BLOOD GLUCOSE      PATIENT CARE ACCESS DEVICES:  [x ] Peripheral IV  [ ] Central Venous Line	[ ] R	[ ] L	[ ] IJ	[ ] Fem	[ ] SC	Placed:   [ ] Arterial Line		[ ] R	[ ] L	[ ] Fem	[ ] Rad	[ ] Ax	Placed:   [ ] PICC:					[ ] Mediport  [x ] Urinary Catheter, Date Placed:   [x] Necessity of urinary, arterial, and venous catheters discussed    OTHER MEDICATIONS: benzocaine 20%/menthol 0.5% Spray 1 Spray(s) Topical every 6 hours PRN  chlorhexidine 2% Cloths 1 Application(s) Topical daily  dibucaine 1% Ointment 1 Application(s) Topical every 6 hours PRN  hydrocortisone 1% Cream 1 Application(s) Topical every 6 hours PRN  lanolin Ointment 1 Application(s) Topical every 6 hours PRN  pramoxine 1%/zinc 5% Cream 1 Application(s) Topical every 4 hours PRN  witch hazel Pads 1 Application(s) Topical every 4 hours PRN      IMAGING STUDIES:

## 2024-04-29 NOTE — DISCHARGE NOTE OB - HOSPITAL COURSE
admitted for elective IOL. admitted for elective IOL.   25, viable female infant.  delivery complicated by retained placenta, manually removed. PPH with resultant acute blood loss anemia.  maternal sepsis.  transfer to SICU for hemodynamic monitoring.  IV Zosyn.

## 2024-04-29 NOTE — CHART NOTE - NSCHARTNOTEFT_GEN_A_CORE
OB Attending - late entry    RRT called after delivery due to persistent maternal tachycardia in the setting of maternal sepsis and acute blood loss.  At time of maternal fever pt was already receiving ancef.  IVF bolus running.  IV tylenol running and toradol given.  RRT at bedside.  Additional liter bolus of LR initiated.    Labs at time of PPH:                        12.1   19.09 )-----------( 123      ( 29 Apr 2024 17:34 )             35.0   PT/INR - ( 29 Apr 2024 20:22 )   PT: 13.6 sec;   INR: 1.22 ratio         PTT - ( 29 Apr 2024 20:22 )  PTT:36.6 sec    Fibrinogen - ( 29 Apr 2024 20:22 ): 165      Pt accepted to SICU for hemodynamic monitoring in setting of sepsis.  Mcintyre placed at bedside.    Plan for repeat labs including blood and urine culture in ICU.  Plan of care discussed with patient and all questions answered.    MD Alexandra OB Attending - late entry    RRT called after delivery due to persistent maternal tachycardia in the setting of maternal sepsis and acute blood loss.  At time of maternal fever pt was already receiving ancef.  IVF bolus running.  IV tylenol running and toradol given.  RRT at bedside.  Additional liter bolus of LR initiated.    Labs at time of PPH:                        12.1   19.09 )-----------( 123      ( 29 Apr 2024 17:34 )             35.0   PT/INR - ( 29 Apr 2024 17:34 )   PT: 10.7 sec;   INR: 0.95 ratio         PTT - ( 29 Apr 2024 17:34 )  PTT:24.6 sec        Pt accepted to SICU for hemodynamic monitoring in setting of sepsis.  Mcintyre placed at bedside.    Plan for repeat labs including blood and urine culture in ICU.  Plan of care discussed with patient and all questions answered.    MD Alexandra

## 2024-04-29 NOTE — OB POSTPARTUM EVENT NOTE - NS_EVENTSUMMARY1_OBGYN_ALL_OB_FT
Patient s/p  with manual removal of placenta. Dr. Dubois in attendance during procedure. Upon delivery of placenta, large amount of bleeding noted. QBL 1092 with immediate control after administration of uterotonics.

## 2024-04-29 NOTE — OB PROVIDER H&P - ASSESSMENT
Assessment  Pt is a 33y  @40.1wks being admitted for eIOL.    Plan  1. Admit to LND. Routine Labs. IVF.  2. IOL w/ buccal cytotec & cervical balloon  3. Fetus: cat 1 tracing. VTX. Continuous EFM. Sono. No concerns.  4. Prenatal issues: none  5. GBS neg  6. Pain: IV pain meds/epidural PRN    Patient discussed with attending physician, Dr. Dubois.      Marta Reza,PGY4

## 2024-04-29 NOTE — OB PROVIDER LABOR PROGRESS NOTE - NS_SUBJECTIVE/OBJECTIVE_OBGYN_ALL_OB_FT
pt examined at bedside for examination due to increased rectal pressure and requesting top-off    Vital Signs Last 24 Hrs  T(C): 37.0 (29 Apr 2024 14:31), Max: 37.0 (29 Apr 2024 14:31)  T(F): 98.6 (29 Apr 2024 14:31), Max: 98.6 (29 Apr 2024 14:31)  HR: 138 (29 Apr 2024 14:48) (95 - 157)  BP: 127/80 (29 Apr 2024 14:39) (98/59 - 147/83)  BP(mean): --  RR: 15 (29 Apr 2024 14:31) (15 - 18)  SpO2: 100% (29 Apr 2024 14:48) (84% - 100%)
Patient seen and examined at bedside for placement of cervical balloon. Cervical balloon placed w/o complications. 60cc NS placed in uterine & vaginal balloons. Pt tolerated procedure well.

## 2024-04-29 NOTE — OB PROVIDER LABOR PROGRESS NOTE - ASSESSMENT
Louisa, now s/p CB placement    -for buccal cytotec    Jessie, PGY4
- continuous fetal monitoring   - routine labor care  - anesthesia contacted for top-off  - anticipate      d/w Dr. Silvino Hickman PGY4

## 2024-04-29 NOTE — OB PROVIDER H&P - HISTORY OF PRESENT ILLNESS
32yo  @40.1wks presents for eIOL. +FM. -LOF. -CTXs. -VB. Pt denies any other concerns.    – PNC: Denies prenatal issues. GBS neg.  EFW 3400g extrapolated from last sono.  – OBHx: denies  – GynHx: h/o chlamydia as a teenager s/p treatment, denies h/o fibroids, abnormal cervical exams  – PMH: denies  – PSH: denies  – Psych: denies h/o anxiety/depression  – Social: denies h/o toxic habits in pregnancy  – Meds: PNV   – Allergies: NKDA  – Will accept blood transfusions? Yes

## 2024-04-29 NOTE — CHART NOTE - NSCHARTNOTEFT_GEN_A_CORE
OB Attending    Pt examined at bedside.  Cervical balloon in the vagina, removed.  VE 3-4/70/-3.  FHT with moderate variability.  Plan for epidural and then to continue IOL with pitocin.    MD Alexandra

## 2024-04-29 NOTE — DISCHARGE NOTE OB - NS MD DC FALL RISK RISK
For information on Fall & Injury Prevention, visit: [email or web address] OR  [email or web address] OR  https://www.cdc.gov/steadi/patient.html

## 2024-04-29 NOTE — DISCHARGE NOTE OB - CARE PROVIDER_API CALL
Vidhya Dubois  Obstetrics and Gynecology  [address], [address]  [place] [address]  Phone: [phone]  Fax: [phone]  Follow Up Time:

## 2024-04-29 NOTE — OB PROVIDER H&P - NSLOWPPHRISK_OBGYN_A_OB
No previous uterine incision/[place] Pregnancy/Less than or equal to 4 previous vaginal births/No known bleeding disorder/No history of postpartum hemorrhage

## 2024-04-29 NOTE — DISCHARGE NOTE OB - PATIENT PORTAL LINK FT
You can access the FollowMyHealth Patient Portal offered by [facility] by registering at the following website: [email or web address]. By joining [employer]’s FollowMyHealth portal, you will also be able to view your health information using other applications (apps) compatible with our system.

## 2024-04-29 NOTE — OB RN PATIENT PROFILE - NSICDXPASTMEDICALHX_GEN_ALL_CORE_FT
PAST MEDICAL HISTORY:  GERD (gastroesophageal reflux disease)     IBS (irritable bowel syndrome)      PAST MEDICAL HISTORY:  GERD (gastroesophageal reflux disease)     Hiatal hernia     IBS (irritable bowel syndrome)

## 2024-04-29 NOTE — OB RN DELIVERY SUMMARY - NSSELHIDDEN_OBGYN_ALL_OB_FT
[NS_DeliveryAttending1_OBGYN_ALL_OB_FT:[id number]],[NS_DeliveryRN_OBGYN_ALL_OB_FT:[email or web address]]

## 2024-04-29 NOTE — DISCHARGE NOTE OB - MEDICATION SUMMARY - MEDICATIONS TO TAKE
I will START or STAY ON the medications listed below when I get home from the hospital:    ibuprofen 600 mg oral tablet  -- 1 tab(s) by mouth every 6 hours  -- Indication: For for pain    acetaminophen 325 mg oral tablet  -- 3 tab(s) by mouth every 8 hours  -- Indication: For for pain    ferrous sulfate 325 mg (65 mg elemental iron) oral tablet  -- 1 tab(s) by mouth 3 times a day  -- Indication: For for anemia

## 2024-04-29 NOTE — OB RN DELIVERY SUMMARY - NS_SEPSISRSKCALC_OBGYN_ALL_OB_FT
EOS calculated successfully. EOS Risk Factor: 0.5/1000 live births ([facility] national incidence); GA=40w1d; Temp=99.86; ROM=7.6; GBS='Negative'; Antibiotics='No antibiotics or any antibiotics < 2 hrs prior to birth'

## 2024-04-29 NOTE — OB POSTPARTUM EVENT NOTE - NS_EVENTPTSUMMARY1_OBGYN_ALL_OB_FT
10units pitocin added to original 20 units of pitocin in 1 liter administered at 1738, rectal cytotec 1000mcg 1738, 1 liter LR bolus.  Ancef 2gm administered IVPB- see emar for timing   SEE VS flow sheet

## 2024-04-30 LAB
ADD ON TEST-SPECIMEN IN LAB: SIGNIFICANT CHANGE UP
ANION GAP SERPL CALC-SCNC: 9 MMOL/L — SIGNIFICANT CHANGE UP (ref 7–14)
APTT BLD: 27.2 SEC — SIGNIFICANT CHANGE UP (ref 24.5–35.6)
APTT BLD: 34.4 SEC — SIGNIFICANT CHANGE UP (ref 24.5–35.6)
BLD GP AB SCN SERPL QL: NEGATIVE — SIGNIFICANT CHANGE UP
BUN SERPL-MCNC: 10 MG/DL — SIGNIFICANT CHANGE UP (ref 7–23)
CALCIUM SERPL-MCNC: 7.6 MG/DL — LOW (ref 8.4–10.5)
CHLORIDE SERPL-SCNC: 103 MMOL/L — SIGNIFICANT CHANGE UP (ref 98–107)
CO2 SERPL-SCNC: 23 MMOL/L — SIGNIFICANT CHANGE UP (ref 22–31)
CREAT SERPL-MCNC: 0.75 MG/DL — SIGNIFICANT CHANGE UP (ref 0.5–1.3)
EGFR: 108 ML/MIN/1.73M2 — SIGNIFICANT CHANGE UP
FIBRINOGEN PPP-MCNC: 194 MG/DL — LOW (ref 200–465)
FIBRINOGEN PPP-MCNC: 250 MG/DL — SIGNIFICANT CHANGE UP (ref 200–465)
GLUCOSE SERPL-MCNC: 68 MG/DL — LOW (ref 70–99)
HCT VFR BLD CALC: 23.1 % — LOW (ref 34.5–45)
HCT VFR BLD CALC: 23.3 % — LOW (ref 34.5–45)
HGB BLD-MCNC: 8 G/DL — LOW (ref 11.5–15.5)
HGB BLD-MCNC: 8.4 G/DL — LOW (ref 11.5–15.5)
INR BLD: 1 RATIO — SIGNIFICANT CHANGE UP (ref 0.85–1.18)
INR BLD: 1.14 RATIO — SIGNIFICANT CHANGE UP (ref 0.85–1.18)
MAGNESIUM SERPL-MCNC: 1.5 MG/DL — LOW (ref 1.6–2.6)
MCHC RBC-ENTMCNC: 32.9 PG — SIGNIFICANT CHANGE UP (ref 27–34)
MCHC RBC-ENTMCNC: 33.2 PG — SIGNIFICANT CHANGE UP (ref 27–34)
MCHC RBC-ENTMCNC: 34.6 GM/DL — SIGNIFICANT CHANGE UP (ref 32–36)
MCHC RBC-ENTMCNC: 36.1 GM/DL — HIGH (ref 32–36)
MCV RBC AUTO: 92.1 FL — SIGNIFICANT CHANGE UP (ref 80–100)
MCV RBC AUTO: 95.1 FL — SIGNIFICANT CHANGE UP (ref 80–100)
NRBC # BLD: 0 /100 WBCS — SIGNIFICANT CHANGE UP (ref 0–0)
NRBC # BLD: 0 /100 WBCS — SIGNIFICANT CHANGE UP (ref 0–0)
NRBC # FLD: 0 K/UL — SIGNIFICANT CHANGE UP (ref 0–0)
NRBC # FLD: 0 K/UL — SIGNIFICANT CHANGE UP (ref 0–0)
PHOSPHATE SERPL-MCNC: 4.7 MG/DL — HIGH (ref 2.5–4.5)
PLATELET # BLD AUTO: 100 K/UL — LOW (ref 150–400)
PLATELET # BLD AUTO: 133 K/UL — LOW (ref 150–400)
POTASSIUM SERPL-MCNC: 4.1 MMOL/L — SIGNIFICANT CHANGE UP (ref 3.5–5.3)
POTASSIUM SERPL-SCNC: 4.1 MMOL/L — SIGNIFICANT CHANGE UP (ref 3.5–5.3)
PROTHROM AB SERPL-ACNC: 11.3 SEC — SIGNIFICANT CHANGE UP (ref 9.5–13)
PROTHROM AB SERPL-ACNC: 12.8 SEC — SIGNIFICANT CHANGE UP (ref 9.5–13)
RBC # BLD: 2.43 M/UL — LOW (ref 3.8–5.2)
RBC # BLD: 2.53 M/UL — LOW (ref 3.8–5.2)
RBC # FLD: 12.9 % — SIGNIFICANT CHANGE UP (ref 10.3–14.5)
RBC # FLD: 13.1 % — SIGNIFICANT CHANGE UP (ref 10.3–14.5)
RETICS #: 66.9 K/UL — SIGNIFICANT CHANGE UP (ref 25–125)
RETICS/RBC NFR: 2.7 % — HIGH (ref 0.5–2.5)
RH IG SCN BLD-IMP: POSITIVE — SIGNIFICANT CHANGE UP
SODIUM SERPL-SCNC: 135 MMOL/L — SIGNIFICANT CHANGE UP (ref 135–145)
WBC # BLD: 16.26 K/UL — HIGH (ref 3.8–10.5)
WBC # BLD: 16.53 K/UL — HIGH (ref 3.8–10.5)
WBC # FLD AUTO: 16.26 K/UL — HIGH (ref 3.8–10.5)
WBC # FLD AUTO: 16.53 K/UL — HIGH (ref 3.8–10.5)

## 2024-04-30 PROCEDURE — 99233 SBSQ HOSP IP/OBS HIGH 50: CPT

## 2024-04-30 RX ORDER — IBUPROFEN 200 MG
600 TABLET ORAL EVERY 6 HOURS
Refills: 0 | Status: DISCONTINUED | OUTPATIENT
Start: 2024-04-30 | End: 2024-05-02

## 2024-04-30 RX ORDER — PIPERACILLIN AND TAZOBACTAM 4; .5 G/20ML; G/20ML
4.5 INJECTION, POWDER, LYOPHILIZED, FOR SOLUTION INTRAVENOUS EVERY 8 HOURS
Refills: 0 | Status: DISCONTINUED | OUTPATIENT
Start: 2024-04-30 | End: 2024-05-01

## 2024-04-30 RX ORDER — MAGNESIUM SULFATE 500 MG/ML
2 VIAL (ML) INJECTION ONCE
Refills: 0 | Status: COMPLETED | OUTPATIENT
Start: 2024-04-30 | End: 2024-04-30

## 2024-04-30 RX ADMIN — SODIUM CHLORIDE 3 MILLILITER(S): 9 INJECTION INTRAMUSCULAR; INTRAVENOUS; SUBCUTANEOUS at 22:00

## 2024-04-30 RX ADMIN — Medication 975 MILLIGRAM(S): at 16:17

## 2024-04-30 RX ADMIN — Medication 975 MILLIGRAM(S): at 22:00

## 2024-04-30 RX ADMIN — Medication 975 MILLIGRAM(S): at 03:52

## 2024-04-30 RX ADMIN — Medication 975 MILLIGRAM(S): at 03:22

## 2024-04-30 RX ADMIN — Medication 975 MILLIGRAM(S): at 15:40

## 2024-04-30 RX ADMIN — PIPERACILLIN AND TAZOBACTAM 25 GRAM(S): 4; .5 INJECTION, POWDER, LYOPHILIZED, FOR SOLUTION INTRAVENOUS at 06:06

## 2024-04-30 RX ADMIN — SODIUM CHLORIDE 3 MILLILITER(S): 9 INJECTION INTRAMUSCULAR; INTRAVENOUS; SUBCUTANEOUS at 13:58

## 2024-04-30 RX ADMIN — PIPERACILLIN AND TAZOBACTAM 200 GRAM(S): 4; .5 INJECTION, POWDER, LYOPHILIZED, FOR SOLUTION INTRAVENOUS at 13:58

## 2024-04-30 RX ADMIN — PIPERACILLIN AND TAZOBACTAM 25 GRAM(S): 4; .5 INJECTION, POWDER, LYOPHILIZED, FOR SOLUTION INTRAVENOUS at 00:39

## 2024-04-30 RX ADMIN — Medication 975 MILLIGRAM(S): at 21:16

## 2024-04-30 RX ADMIN — SODIUM CHLORIDE 3 MILLILITER(S): 9 INJECTION INTRAMUSCULAR; INTRAVENOUS; SUBCUTANEOUS at 05:37

## 2024-04-30 RX ADMIN — Medication 600 MILLIGRAM(S): at 18:04

## 2024-04-30 RX ADMIN — Medication 600 MILLIGRAM(S): at 18:48

## 2024-04-30 RX ADMIN — PIPERACILLIN AND TAZOBACTAM 200 GRAM(S): 4; .5 INJECTION, POWDER, LYOPHILIZED, FOR SOLUTION INTRAVENOUS at 22:51

## 2024-04-30 RX ADMIN — Medication 25 GRAM(S): at 09:39

## 2024-04-30 RX ADMIN — Medication 975 MILLIGRAM(S): at 09:58

## 2024-04-30 RX ADMIN — Medication 1 TABLET(S): at 11:14

## 2024-04-30 NOTE — PROGRESS NOTE ADULT - SUBJECTIVE AND OBJECTIVE BOX
Pt seen at bedside in the SICU.  Feels well.  Denies dizziness, lightheadedness.  Not yet OOB.  adequate UOP.  Pain controlled.  Lochia WNL.  Afebrile.    Vital Signs Last 24 Hrs  T(C): 36.6 (2024 04:00), Max: 39.6 (2024 19:45)  T(F): 97.8 (2024 04:00), Max: 103.3 (2024 19:45)  HR: 104 (2024 07:00) (83 - 184)  BP: 97/74 (2024 07:00) (75/39 - 204/154)  BP(mean): 81 (:) (66 - 87)  RR: 16 (:00) (13 - 26)  SpO2: 100% (:00) (81% - 100%)    Parameters below as of 2024 07:00  Patient On (Oxygen Delivery Method): room air        I&O's Detail    2024 07:01  -  2024 07:00  --------------------------------------------------------  IN:    IV PiggyBack: 300 mL    Lactated Ringers: 1750 mL    Lactated Ringers Bolus: 2000 mL    Oxytocin.: 500 mL  Total IN: 4550 mL    OUT:    Blood Loss (mL): 1092 mL    Indwelling Catheter - Urethral (mL): 1180 mL  Total OUT: 2272 mL    Total NET: 2278 mL          Labs:                        8.0    16.26 )-----------( 100      ( 2024 05:40 )             23.1                         8.7    16.11 )-----------( 94       ( 2024 21:16 )             24.9                         7.6    14.53 )-----------( 73       ( 2024 20:22 )             21.2                         12.1   19.09 )-----------( 123      ( 2024 17:34 )             35.0                         14.4   10.15 )-----------( 145      ( 2024 05:20 )             38.9         PT/INR - ( 2024 05:40 )   PT: 12.8 sec;   INR: 1.14 ratio         PTT - ( 2024 05:40 )  PTT:34.4 sec    Fibrinogen:     Lactate: Lactate, Blood: 1.3 mmol/L ( @ 21:16)      MEDICATIONS  (STANDING):  acetaminophen     Tablet .. 975 milliGRAM(s) Oral <User Schedule>  diphtheria/tetanus/pertussis (acellular) Vaccine (Adacel) 0.5 milliLiter(s) IntraMuscular once  ibuprofen  Tablet. 600 milliGRAM(s) Oral every 6 hours  piperacillin/tazobactam IVPB.. 3.375 Gram(s) IV Intermittent every 8 hours  prenatal multivitamin 1 Tablet(s) Oral daily  sodium chloride 0.9% lock flush 3 milliLiter(s) IV Push every 8 hours      Abd soft, NT.  FF, mild tenderness.  Perineum with vulvar edema, slightly improved.  Lochia minimal.    Management and Follow-up plan :    PPD 1 s/p  c/b retained placenta with manual removal, PPH and maternal sepsis likely secondary to chorioamnionitis and now endometritis.  Pt in SICU for hemodynamic monitoring in setting of maternal sepsis, acute blood loss anemia.  Afebrile.  Tachycardia improved.  Mildly hypotensive, however MAP >65.  Currently no on maintenance fluids.  -continue Zosyn.  Fluid resuscitation as needed.    -f/u blood, urine, placenta cultures.  -Hb 8.  No signs of ongoing bleeding.  consider transfusion if symptomatic/worsening anemia.  -ice packs to perineum.  monitor lochia.  -appreciate excellent SICU care.    MD Alexandra.        -------------------------------------------------------------------------------------------------------------

## 2024-04-30 NOTE — PROGRESS NOTE ADULT - SUBJECTIVE AND OBJECTIVE BOX
24 HOUR EVENTS:  - Tachycardia resolved    SUBJECTIVE/ROS:  Patient was seen and examined on AM rounds.    NEURO  Exam: AOx4, No CNS deficits, reports RUE pins/needles feeling  Meds: acetaminophen     Tablet .. 975 milliGRAM(s) Oral <User Schedule>  diphenhydrAMINE 25 milliGRAM(s) Oral every 6 hours PRN Pruritus  ibuprofen  Tablet. 600 milliGRAM(s) Oral every 6 hours  oxyCODONE    IR 5 milliGRAM(s) Oral once PRN Moderate to Severe Pain (4-10)  oxyCODONE    IR 5 milliGRAM(s) Oral every 3 hours PRN Moderate to Severe Pain (4-10)  [x] Adequacy of sedation and pain control has been assessed and adjusted    RESPIRATORY  RR: 20 (04-30-24 @ 01:00) (14 - 26)  SpO2: 100% (04-30-24 @ 01:00) (81% - 100%)  Exam: unlabored respirations, CTA b/l. No wheezing, rales, rhonci, crackles appreciated.   ABG - ( 29 Apr 2024 21:16 )  pH: x     /  pCO2: x     /  pO2: x     / HCO3: x     / Base Excess: x     /  SaO2: x       Lactate: 1.3    Meds:       CARDIOVASCULAR  HR: 101 (04-30-24 @ 01:00) (95 - 184)  BP: 100/68 (04-30-24 @ 01:00) (75/39 - 204/154)  BP(mean): 78 (04-30-24 @ 01:00) (69 - 87)  Lactate, Blood: 1.3 mmol/L (04-29 @ 21:16)  Exam: S1S2. No murmurs, rubs, gallops appreciated.  Cardiac Rhythm: NSR  Meds:     GI/NUTRITION  Exam: Soft, non-distended, non-tender. Gravid  Diet:  regular  Meds: magnesium hydroxide Suspension 30 milliLiter(s) Oral two times a day PRN Constipation  simethicone 80 milliGRAM(s) Chew every 4 hours PRN Gas      GENITOURINARY  I&O's Detail    04-28 @ 07:01  -  04-29 @ 07:00  --------------------------------------------------------  IN:    Lactated Ringers: 250 mL  Total IN: 250 mL    OUT:  Total OUT: 0 mL    Total NET: 250 mL      04-29 @ 07:01  -  04-30 @ 02:02  --------------------------------------------------------  IN:    IV PiggyBack: 200 mL    Lactated Ringers: 1750 mL    Lactated Ringers Bolus: 2000 mL    Oxytocin.: 500 mL  Total IN: 4450 mL    OUT:    Blood Loss (mL): 1092 mL    Indwelling Catheter - Urethral (mL): 950 mL  Total OUT: 2042 mL    Total NET: 2408 mL      Weight (kg): 62.1 (04-29 @ 05:28)  04-29    133<L>  |  99  |  9   ----------------------------<  231<H>  3.5   |  20<L>  |  0.63    Ca    7.5<L>      29 Apr 2024 21:16  Phos  1.7     04-29  Mg     0.80     04-29    TPro  4.2<L>  /  Alb  2.2<L>  /  TBili  0.5  /  DBili  x   /  AST  31  /  ALT  8   /  AlkPhos  134<H>  04-29    [x ] Mcintyre catheter, indication: N/A  Meds: oxytocin Infusion 41.667 milliUNIT(s)/Min IV Continuous <Continuous>  prenatal multivitamin 1 Tablet(s) Oral daily  sodium chloride 0.9% lock flush 3 milliLiter(s) IV Push every 8 hours    HEMATOLOGIC  [x] VTE Prophylaxis on hold                        8.7    16.11 )-----------( 94       ( 29 Apr 2024 21:16 )             24.9     PT/INR - ( 29 Apr 2024 21:16 )   PT: 12.7 sec;   INR: 1.14 ratio         PTT - ( 29 Apr 2024 21:16 )  PTT:33.4 sec  Transfusion     [ ] PRBC   [ ] Platelets   [ ] FFP   [ ] Cryoprecipitate      INFECTIOUS DISEASES  T(C): 36.8 (04-30-24 @ 00:00), Max: 39.6 (04-29-24 @ 19:45)  Wt(kg): --  WBC Count: 16.11 K/uL (04-29 @ 21:16)  WBC Count: 14.53 K/uL (04-29 @ 20:22)  WBC Count: 19.09 K/uL (04-29 @ 17:34)  WBC Count: 10.15 K/uL (04-29 @ 05:20)    Recent Cultures:    Meds: diphtheria/tetanus/pertussis (acellular) Vaccine (Adacel) 0.5 milliLiter(s) IntraMuscular once  piperacillin/tazobactam IVPB.. 3.375 Gram(s) IV Intermittent every 8 hours    ACCESS DEVICES:  [ ] Peripheral IV  [ ] Central Venous Line	[ ] R	[ ] L	[ ] IJ	[ ] Fem	[ ] SC	Placed:   [ ] Arterial Line		[ ] R	[ ] L	[ ] Fem	[ ] Rad	[ ] Ax	Placed:   [ ] PICC:					[ ] Mediport  [ ] Urinary Catheter, Date Placed:   [ ] Necessity of urinary, arterial, and venous catheters discussed    OTHER MEDICATIONS:  benzocaine 20%/menthol 0.5% Spray 1 Spray(s) Topical every 6 hours PRN  dibucaine 1% Ointment 1 Application(s) Topical every 6 hours PRN  hydrocortisone 1% Cream 1 Application(s) Topical every 6 hours PRN  lanolin Ointment 1 Application(s) Topical every 6 hours PRN  pramoxine 1%/zinc 5% Cream 1 Application(s) Topical every 4 hours PRN  witch hazel Pads 1 Application(s) Topical every 4 hours PRN    CODE STATUS:  FULL    IMAGING: 24 HOUR EVENTS:  - Tachycardia resolved    AM events  -Listed   -D/c edmar    SUBJECTIVE/ROS:  Patient was seen and examined on AM rounds.    NEURO  Exam: AOx4, No CNS deficits, reports RUE pins/needles feeling  Meds: acetaminophen     Tablet .. 975 milliGRAM(s) Oral <User Schedule>  diphenhydrAMINE 25 milliGRAM(s) Oral every 6 hours PRN Pruritus  ibuprofen  Tablet. 600 milliGRAM(s) Oral every 6 hours  oxyCODONE    IR 5 milliGRAM(s) Oral once PRN Moderate to Severe Pain (4-10)  oxyCODONE    IR 5 milliGRAM(s) Oral every 3 hours PRN Moderate to Severe Pain (4-10)  [x] Adequacy of sedation and pain control has been assessed and adjusted    RESPIRATORY  RR: 20 (04-30-24 @ 01:00) (14 - 26)  SpO2: 100% (04-30-24 @ 01:00) (81% - 100%)  Exam: unlabored respirations, CTA b/l. No wheezing, rales, rhonci, crackles appreciated.   ABG - ( 29 Apr 2024 21:16 )  pH: x     /  pCO2: x     /  pO2: x     / HCO3: x     / Base Excess: x     /  SaO2: x       Lactate: 1.3    Meds:       CARDIOVASCULAR  HR: 101 (04-30-24 @ 01:00) (95 - 184)  BP: 100/68 (04-30-24 @ 01:00) (75/39 - 204/154)  BP(mean): 78 (04-30-24 @ 01:00) (69 - 87)  Lactate, Blood: 1.3 mmol/L (04-29 @ 21:16)  Exam: S1S2. No murmurs, rubs, gallops appreciated.  Cardiac Rhythm: NSR  Meds:     GI/NUTRITION  Exam: Soft, non-distended, non-tender. Gravid  Diet:  regular  Meds: magnesium hydroxide Suspension 30 milliLiter(s) Oral two times a day PRN Constipation  simethicone 80 milliGRAM(s) Chew every 4 hours PRN Gas      GENITOURINARY  I&O's Detail    04-28 @ 07:01  -  04-29 @ 07:00  --------------------------------------------------------  IN:    Lactated Ringers: 250 mL  Total IN: 250 mL    OUT:  Total OUT: 0 mL    Total NET: 250 mL      04-29 @ 07:01  -  04-30 @ 02:02  --------------------------------------------------------  IN:    IV PiggyBack: 200 mL    Lactated Ringers: 1750 mL    Lactated Ringers Bolus: 2000 mL    Oxytocin.: 500 mL  Total IN: 4450 mL    OUT:    Blood Loss (mL): 1092 mL    Indwelling Catheter - Urethral (mL): 950 mL  Total OUT: 2042 mL    Total NET: 2408 mL      Weight (kg): 62.1 (04-29 @ 05:28)  04-29    133<L>  |  99  |  9   ----------------------------<  231<H>  3.5   |  20<L>  |  0.63    Ca    7.5<L>      29 Apr 2024 21:16  Phos  1.7     04-29  Mg     0.80     04-29    TPro  4.2<L>  /  Alb  2.2<L>  /  TBili  0.5  /  DBili  x   /  AST  31  /  ALT  8   /  AlkPhos  134<H>  04-29    [x ] Mcintyre catheter, indication: N/A  Meds: oxytocin Infusion 41.667 milliUNIT(s)/Min IV Continuous <Continuous>  prenatal multivitamin 1 Tablet(s) Oral daily  sodium chloride 0.9% lock flush 3 milliLiter(s) IV Push every 8 hours    HEMATOLOGIC  [x] VTE Prophylaxis on hold                        8.7    16.11 )-----------( 94       ( 29 Apr 2024 21:16 )             24.9     PT/INR - ( 29 Apr 2024 21:16 )   PT: 12.7 sec;   INR: 1.14 ratio         PTT - ( 29 Apr 2024 21:16 )  PTT:33.4 sec  Transfusion     [ ] PRBC   [ ] Platelets   [ ] FFP   [ ] Cryoprecipitate      INFECTIOUS DISEASES  T(C): 36.8 (04-30-24 @ 00:00), Max: 39.6 (04-29-24 @ 19:45)  Wt(kg): --  WBC Count: 16.11 K/uL (04-29 @ 21:16)  WBC Count: 14.53 K/uL (04-29 @ 20:22)  WBC Count: 19.09 K/uL (04-29 @ 17:34)  WBC Count: 10.15 K/uL (04-29 @ 05:20)    Recent Cultures:    Meds: diphtheria/tetanus/pertussis (acellular) Vaccine (Adacel) 0.5 milliLiter(s) IntraMuscular once  piperacillin/tazobactam IVPB.. 3.375 Gram(s) IV Intermittent every 8 hours    ACCESS DEVICES:  [ ] Peripheral IV  [ ] Central Venous Line	[ ] R	[ ] L	[ ] IJ	[ ] Fem	[ ] SC	Placed:   [ ] Arterial Line		[ ] R	[ ] L	[ ] Fem	[ ] Rad	[ ] Ax	Placed:   [ ] PICC:					[ ] Mediport  [ ] Urinary Catheter, Date Placed:   [ ] Necessity of urinary, arterial, and venous catheters discussed    OTHER MEDICATIONS:  benzocaine 20%/menthol 0.5% Spray 1 Spray(s) Topical every 6 hours PRN  dibucaine 1% Ointment 1 Application(s) Topical every 6 hours PRN  hydrocortisone 1% Cream 1 Application(s) Topical every 6 hours PRN  lanolin Ointment 1 Application(s) Topical every 6 hours PRN  pramoxine 1%/zinc 5% Cream 1 Application(s) Topical every 4 hours PRN  witch hazel Pads 1 Application(s) Topical every 4 hours PRN    CODE STATUS:  FULL    IMAGING:

## 2024-04-30 NOTE — CHART NOTE - NSCHARTNOTEFT_GEN_A_CORE
Patient is 33y old, PPD 1 s/p  c/b retained placenta with manual removal, PPH and maternal sepsis likely secondary to chorioamnionitis and now endometritis.    Pt seen at bedside in SICU.  c/o tingling in her right hand - has had similar throughout pregnancy c/w carpel tunnel symptoms.    otherwise feels well, just tired.  not yet OOB.  currently denies dizziness, lightheadedness.  no CP, SOB, or palpitations.  mild uterine cramping.  Currently afebrile.      Vital Signs Last 24 Hrs  T(C): 36.8 (:00), Max: 39.6 (2024 19:45)  T(F): 98.3 (:00), Max: 103.3 (2024 19:45)  HR: 101 () (95 - 184)  BP: 100/68 () (75/39 - 204/154)  BP(mean): 78 () (69 - 87)  RR: 20 () (14 - 26)  SpO2: 100% () (81% - 100%)    Parameters below as of 00  Patient On (Oxygen Delivery Method): room air        I&O's Detail    2024 07:01  -  2024 07:00  --------------------------------------------------------  IN:    Lactated Ringers: 250 mL  Total IN: 250 mL    OUT:  Total OUT: 0 mL    Total NET: 250 mL      2024 07:01  -  2024 01:33  --------------------------------------------------------  IN:    IV PiggyBack: 200 mL    Lactated Ringers: 1750 mL    Lactated Ringers Bolus: 2000 mL    Oxytocin.: 500 mL  Total IN: 4450 mL    OUT:    Blood Loss (mL): 1092 mL    Indwelling Catheter - Urethral (mL): 950 mL  Total OUT: 2042 mL    Total NET: 2408 mL          Labs:                        8.7    16.11 )-----------( 94       ( 2024 21:16 )             24.9                         7.6    14.53 )-----------( 73       ( 2024 20:22 )             21.2                         12.1   19.09 )-----------( 123      ( 2024 17:34 )             35.0                         14.4   10.15 )-----------( 145      ( 2024 05:20 )             38.9         PT/INR - ( 2024 21:16 )   PT: 12.7 sec;   INR: 1.14 ratio         PTT - ( 2024 21:16 )  PTT:33.4 sec    Fibrinogen:     Lactate: Lactate, Blood: 1.3 mmol/L ( @ 21:16)      MEDICATIONS  (STANDING):  acetaminophen     Tablet .. 975 milliGRAM(s) Oral <User Schedule>  diphtheria/tetanus/pertussis (acellular) Vaccine (Adacel) 0.5 milliLiter(s) IntraMuscular once  ibuprofen  Tablet. 600 milliGRAM(s) Oral every 6 hours  oxytocin Infusion 41.667 milliUNIT(s)/Min (125 mL/Hr) IV Continuous <Continuous>  piperacillin/tazobactam IVPB.. 3.375 Gram(s) IV Intermittent every 8 hours  prenatal multivitamin 1 Tablet(s) Oral daily  sodium chloride 0.9% lock flush 3 milliLiter(s) IV Push every 8 hours      Evaluation :    Fundus firm and at umbilicus, mildly tender.  Repeat bedside transabdominal sono again reveals thin endometrial echo.  Normal lochia.  Vulvar edema which is soft.  No evidence on exam of vulvar hematoma.        Management and Follow-up plan :    PPD 1 s/p  c/b retained placenta with manual removal, PPH and maternal sepsis likely secondary to chorioamnionitis and now endometritis.  Pt in SICU for hemodynamic monitoring in setting of maternal sepsis, acute blood loss anemia.  Afebrile.  Tachycardia improved.  Mildly hypotensive, however MAP >65.  -continue Zosyn.  Fluid resuscitation.    -f/u blood, urine, placenta cultures.  -f/u repeat labs.  consider transfusion if symptomatic/worsening anemia.  -ice packs to perineum.  monitor lochia.  -appreciate excellent SICU care.    MD Alexandra

## 2024-04-30 NOTE — PROGRESS NOTE ADULT - ASSESSMENT
33y  Female presents for elective IOL. She is now s/p vaginal delivery w/ 1L EBL and manual delivery of non-intact placenta. Hemostasis achieved, 2nd degree laceration repaired and Ancef initiated for manual extraction. Patient subsequently became tachycardic to the 170s and febrile to 38.3. RRT called for c/f septic shock. SICU consulted for hemodynamic management and infectious workup.     PLAN:   Neurologic:  - AOx4  - pain control: tylenol & motrin, oxy prn  - Benadryl prn for pruritis    Respiratory:  - Maintain >96%   - on room air    Cardiovascular:  - MAP >65  - Monitor tachycardia    Gastrointestinal/Nutrition:  - Diet: regular  - simethicone prn  - Bowel Reg: magnesium PO prn    Genitourinary/Renal:  - hyatt  - judicious fluid resuscitation (s/p 3L bolus)   - monitor and replete electrolytes  - negative UA    Hematologic:  -chemical VTE prophylaxis to start in am if no e/o ongoing bleeding    Infectious Disease:  - Zosyn (-  - F/u blood cx  - ancef x1  - Monitor WBC and fever    Endocrine:  - stopped oxytocin gtt per protocol  - Monitor glucose    Disposition: SICU  Code: FULL CODE   33y  Female presents for elective IOL. She is now s/p vaginal delivery w/ 1L EBL and manual delivery of non-intact placenta. Hemostasis achieved, 2nd degree laceration repaired and Ancef initiated for manual extraction. Patient subsequently became tachycardic to the 170s and febrile to 38.3. RRT called for c/f septic shock. SICU consulted for hemodynamic management and infectious workup.        PLAN:   Neurologic:  - AOx4  - pain control: tylenol & motrin, oxy prn    Respiratory:  - Maintain >96%   - on room air    Cardiovascular:  - MAP >65  - Monitor tachycardia (improved overnight)    Gastrointestinal/Nutrition:  - Diet: regular  - simethicone prn  - Bowel Reg: magnesium PO prn    Genitourinary/Renal:  - hyatt  - judicious resuscitation (s/p 3L bolus)  - monitor and replete electrolytes  - f/u urinalysis    Hematologic:  - hold dvt ppx with plans to restart if hgb stable in AM    Infectious Disease:  - Zosyn (-  - F/u blood cx  - ancef x1  - Monitor WBC and fever    Endocrine:  - stopped oxytocin gtt overnight per protocol    Disposition: SICU  Code: FULL CODE

## 2024-04-30 NOTE — CHART NOTE - NSCHARTNOTEFT_GEN_A_CORE
Pt is a 32 yo now P1 who is PPD #1 s/p  c/b PPH (EBL = 1092), retained placenta requiring manual extraction, and sepsis likely 2/2 endometritis, who is admitted to SICU for closer monitoring. Evaluated at bedside. Well appearing. Denies HA/dizziness/lightheadedness, CP/SOB, N/V. Reports tingling in her right hands and numbness in her right leg. Reports abdominal tenderness + cramping.     PE:   Vital Signs Last 24 Hrs  T(C): 36.8 (2024 00:00), Max: 39.6 (2024 19:45)  T(F): 98.3 (2024 00:00), Max: 103.3 (2024 19:45)  HR: 87 (2024 03:00) (84 - 184)  BP: 87/56 (2024 03:00) (75/39 - 204/154)  BP(mean): 66 (2024 03:00) (66 - 87)  RR: 13 (2024 03:00) (13 - 26)  SpO2: 98% (2024 03:00) (81% - 100%)    Parameters below as of 2024 03:00  Patient On (Oxygen Delivery Method): room air    Gen: AOx3, NAD. Sitting up comfortably in bed  Abd: Soft, appropriately tender   BSUS: Thin endometrial stripe, no color flow   Pelvic: Edematous labia + perineum. No evidence of hematoma. Minimal trickling of blood with fundal check   Extremities: Non-pitting edema on bilateral extremities     A/P:     32 yo now P1 who is PPD #1 s/p  c/b PPH (EBL = 1092), retained placenta requiring manual extraction, and sepsis likely 2/2 endometritis, who is admitted to SICU for higher level care. At time of transfer to SICU, patient significantly tachycardic with HRs persistently in 150s and greater and febrile to 103.3 rectally. Stat labs were performed upon transfer to SICU, with initial set notable for decrease across all cell lines. Labs were repeated due to concern for dilution/blood being drawn from above fluid line which was notable for H/H 8.7/24.9 (14.4/38.9 on admission), platelets of 94 (145 on admission). At time of eval, patient noted to be afebrile, with HR in high 90s - low 100s, and BPs 90s/50s-60s. Patient more hemodynamically stable, sepsis resolving.     #Endometritis, c/f sepsis    - c/w Zosyn until 24h afebrile  - f/u BCx (), Placental Cx (), UCx ()   - trend WBC   - fever curve   - IVF   - Tylenol/Motrin prn   - appreciate excellent SICU care     #PPH   - s/p multiple uterotonics; 10u IM pit, 1000 mcg cytotec KS   - continue to trend H/H   - consider pRBC transfusion if hgb continues to drop and patient symptomatic   - strict pad counts     #Postpartum   - Reg diet   - Tylenol/Motrin/Oxy for pain   - OOB/ambulation, venodynes for DVT ppx   - breast pump     Pt evaluated with attending MD Dr. Silvino Anthony PGY3

## 2024-04-30 NOTE — PROGRESS NOTE ADULT - SUBJECTIVE AND OBJECTIVE BOX
ANESTHESIA POSTOP CHECK    33y Female POSTOP DAY 1     Vital Signs Last 24 Hrs  T(C): 36.5 (30 Apr 2024 08:00), Max: 39.6 (29 Apr 2024 19:45)  T(F): 97.7 (30 Apr 2024 08:00), Max: 103.3 (29 Apr 2024 19:45)  HR: 116 (30 Apr 2024 08:00) (83 - 184)  BP: 99/65 (30 Apr 2024 08:00) (75/39 - 204/154)  BP(mean): 77 (30 Apr 2024 08:00) (66 - 87)  RR: 25 (30 Apr 2024 08:00) (13 - 26)  SpO2: 100% (30 Apr 2024 08:00) (81% - 100%)    Parameters below as of 30 Apr 2024 08:00  Patient On (Oxygen Delivery Method): room air            [x ] NO APPARENT ANESTHESIA COMPLICATIONS

## 2024-04-30 NOTE — PATIENT PROFILE ADULT - FALL HARM RISK - UNIVERSAL INTERVENTIONS
Bed in lowest position, wheels locked, appropriate side rails in place/Call bell, personal items and telephone in reach/Instruct patient to call for assistance before getting out of bed or chair/Non-slip footwear when patient is out of bed/[place] to call system/Physically safe environment - no spills, clutter or unnecessary equipment/Purposeful Proactive Rounding/Room/bathroom lighting operational, light cord in reach

## 2024-05-01 LAB
ANION GAP SERPL CALC-SCNC: 5 MMOL/L — LOW (ref 7–14)
BUN SERPL-MCNC: 12 MG/DL — SIGNIFICANT CHANGE UP (ref 7–23)
CALCIUM SERPL-MCNC: 7.5 MG/DL — LOW (ref 8.4–10.5)
CHLORIDE SERPL-SCNC: 109 MMOL/L — HIGH (ref 98–107)
CO2 SERPL-SCNC: 26 MMOL/L — SIGNIFICANT CHANGE UP (ref 22–31)
CREAT SERPL-MCNC: 0.69 MG/DL — SIGNIFICANT CHANGE UP (ref 0.5–1.3)
EGFR: 118 ML/MIN/1.73M2 — SIGNIFICANT CHANGE UP
GLUCOSE SERPL-MCNC: 68 MG/DL — LOW (ref 70–99)
HCT VFR BLD CALC: 22.3 % — LOW (ref 34.5–45)
HCT VFR BLD CALC: 28.7 % — LOW (ref 34.5–45)
HGB BLD-MCNC: 10 G/DL — LOW (ref 11.5–15.5)
HGB BLD-MCNC: 7.8 G/DL — LOW (ref 11.5–15.5)
MCHC RBC-ENTMCNC: 32.4 PG — SIGNIFICANT CHANGE UP (ref 27–34)
MCHC RBC-ENTMCNC: 33.3 PG — SIGNIFICANT CHANGE UP (ref 27–34)
MCHC RBC-ENTMCNC: 34.8 GM/DL — SIGNIFICANT CHANGE UP (ref 32–36)
MCHC RBC-ENTMCNC: 35 GM/DL — SIGNIFICANT CHANGE UP (ref 32–36)
MCV RBC AUTO: 92.9 FL — SIGNIFICANT CHANGE UP (ref 80–100)
MCV RBC AUTO: 95.3 FL — SIGNIFICANT CHANGE UP (ref 80–100)
NRBC # BLD: 0 /100 WBCS — SIGNIFICANT CHANGE UP (ref 0–0)
NRBC # BLD: 0 /100 WBCS — SIGNIFICANT CHANGE UP (ref 0–0)
NRBC # FLD: 0 K/UL — SIGNIFICANT CHANGE UP (ref 0–0)
NRBC # FLD: 0 K/UL — SIGNIFICANT CHANGE UP (ref 0–0)
PLATELET # BLD AUTO: 123 K/UL — LOW (ref 150–400)
PLATELET # BLD AUTO: 155 K/UL — SIGNIFICANT CHANGE UP (ref 150–400)
POTASSIUM SERPL-MCNC: 3.9 MMOL/L — SIGNIFICANT CHANGE UP (ref 3.5–5.3)
POTASSIUM SERPL-SCNC: 3.9 MMOL/L — SIGNIFICANT CHANGE UP (ref 3.5–5.3)
RBC # BLD: 2.34 M/UL — LOW (ref 3.8–5.2)
RBC # BLD: 3.09 M/UL — LOW (ref 3.8–5.2)
RBC # FLD: 13.2 % — SIGNIFICANT CHANGE UP (ref 10.3–14.5)
RBC # FLD: 15 % — HIGH (ref 10.3–14.5)
SODIUM SERPL-SCNC: 140 MMOL/L — SIGNIFICANT CHANGE UP (ref 135–145)
WBC # BLD: 13.17 K/UL — HIGH (ref 3.8–10.5)
WBC # BLD: 14.41 K/UL — HIGH (ref 3.8–10.5)
WBC # FLD AUTO: 13.17 K/UL — HIGH (ref 3.8–10.5)
WBC # FLD AUTO: 14.41 K/UL — HIGH (ref 3.8–10.5)

## 2024-05-01 RX ORDER — DIPHENHYDRAMINE HCL 50 MG
25 CAPSULE ORAL ONCE
Refills: 0 | Status: COMPLETED | OUTPATIENT
Start: 2024-05-01 | End: 2024-05-01

## 2024-05-01 RX ORDER — SENNA PLUS 8.6 MG/1
1 TABLET ORAL
Refills: 0 | Status: DISCONTINUED | OUTPATIENT
Start: 2024-05-01 | End: 2024-05-02

## 2024-05-01 RX ORDER — ASCORBIC ACID 60 MG
500 TABLET,CHEWABLE ORAL DAILY
Refills: 0 | Status: DISCONTINUED | OUTPATIENT
Start: 2024-05-01 | End: 2024-05-02

## 2024-05-01 RX ORDER — FERROUS SULFATE 325(65) MG
325 TABLET ORAL THREE TIMES A DAY
Refills: 0 | Status: DISCONTINUED | OUTPATIENT
Start: 2024-05-01 | End: 2024-05-02

## 2024-05-01 RX ADMIN — Medication 975 MILLIGRAM(S): at 09:12

## 2024-05-01 RX ADMIN — Medication 500 MILLIGRAM(S): at 09:12

## 2024-05-01 RX ADMIN — Medication 325 MILLIGRAM(S): at 09:12

## 2024-05-01 RX ADMIN — Medication 600 MILLIGRAM(S): at 14:55

## 2024-05-01 RX ADMIN — Medication 325 MILLIGRAM(S): at 22:00

## 2024-05-01 RX ADMIN — Medication 1 TABLET(S): at 14:55

## 2024-05-01 RX ADMIN — Medication 600 MILLIGRAM(S): at 06:36

## 2024-05-01 RX ADMIN — Medication 600 MILLIGRAM(S): at 00:10

## 2024-05-01 RX ADMIN — SODIUM CHLORIDE 3 MILLILITER(S): 9 INJECTION INTRAMUSCULAR; INTRAVENOUS; SUBCUTANEOUS at 04:51

## 2024-05-01 RX ADMIN — Medication 600 MILLIGRAM(S): at 21:01

## 2024-05-01 RX ADMIN — Medication 25 MILLIGRAM(S): at 10:26

## 2024-05-01 RX ADMIN — Medication 975 MILLIGRAM(S): at 10:00

## 2024-05-01 RX ADMIN — Medication 975 MILLIGRAM(S): at 18:59

## 2024-05-01 RX ADMIN — Medication 600 MILLIGRAM(S): at 15:30

## 2024-05-01 RX ADMIN — SODIUM CHLORIDE 3 MILLILITER(S): 9 INJECTION INTRAMUSCULAR; INTRAVENOUS; SUBCUTANEOUS at 14:00

## 2024-05-01 RX ADMIN — Medication 600 MILLIGRAM(S): at 21:45

## 2024-05-01 RX ADMIN — Medication 600 MILLIGRAM(S): at 06:08

## 2024-05-01 RX ADMIN — Medication 975 MILLIGRAM(S): at 18:09

## 2024-05-01 RX ADMIN — SODIUM CHLORIDE 3 MILLILITER(S): 9 INJECTION INTRAMUSCULAR; INTRAVENOUS; SUBCUTANEOUS at 22:00

## 2024-05-01 RX ADMIN — Medication 600 MILLIGRAM(S): at 01:00

## 2024-05-01 NOTE — PROGRESS NOTE ADULT - SUBJECTIVE AND OBJECTIVE BOX
OB Progress Note:  PPD#2    S: 32yo PPD#2 s/p  c/b retained placenta with manual removal, PPH and maternal sepsis likely secondary to chorioamnionitis and now endometritis. Was in SICU for hemodynamic monitoring in the setting of maternal sepsis and acute blood loss anemia, moved to postpartum floor yesterday. Patient feels well. Pain is well controlled, tolerating regular diet, passing flatus, voiding spontaneously, ambulating without difficulty. Denies heavy vaginal bleeding, CP/SOB, lightheadedness/dizziness, N/V.    O:  Vitals:   Vital Signs Last 24 Hrs  T(C): 36.4 (01 May 2024 01:31), Max: 36.7 (2024 12:00)  T(F): 97.6 (01 May 2024 01:31), Max: 98 (2024 12:00)  HR: 95 (01 May 2024 01:31) (83 - 116)  BP: 94/56 (01 May 2024 01:31) (85/61 - 104/66)  BP(mean): 77 (2024 08:00) (66 - 81)  RR: 18 (01 May 2024 01:31) (13 - 25)  SpO2: 98% (01 May 2024 01:31) (98% - 100%)    Parameters below as of 01 May 2024 01:31  Patient On (Oxygen Delivery Method): room air        MEDICATIONS  (STANDING):  acetaminophen     Tablet .. 975 milliGRAM(s) Oral <User Schedule>  diphtheria/tetanus/pertussis (acellular) Vaccine (Adacel) 0.5 milliLiter(s) IntraMuscular once  ibuprofen  Tablet. 600 milliGRAM(s) Oral every 6 hours  prenatal multivitamin 1 Tablet(s) Oral daily  sodium chloride 0.9% lock flush 3 milliLiter(s) IV Push every 8 hours      MEDICATIONS  (PRN):  benzocaine 20%/menthol 0.5% Spray 1 Spray(s) Topical every 6 hours PRN for Perineal discomfort  dibucaine 1% Ointment 1 Application(s) Topical every 6 hours PRN Perineal discomfort  hydrocortisone 1% Cream 1 Application(s) Topical every 6 hours PRN Moderate Pain (4-6)  lanolin Ointment 1 Application(s) Topical every 6 hours PRN nipple soreness  magnesium hydroxide Suspension 30 milliLiter(s) Oral two times a day PRN Constipation  oxyCODONE    IR 5 milliGRAM(s) Oral once PRN Moderate to Severe Pain (4-10)  oxyCODONE    IR 5 milliGRAM(s) Oral every 3 hours PRN Moderate to Severe Pain (4-10)  pramoxine 1%/zinc 5% Cream 1 Application(s) Topical every 4 hours PRN Moderate Pain (4-6)  simethicone 80 milliGRAM(s) Chew every 4 hours PRN Gas  witch hazel Pads 1 Application(s) Topical every 4 hours PRN Perineal discomfort        Labs:  Blood type: O Positive  Rubella IgG: RPR: Negative                          8.4<L>   16.53<H> >-----------< 133<L>    (  @ 13:14 )             23.3<L>                        8.0<L>   16.26<H> >-----------< 100<L>    (  @ 05:40 )             23.1<L>                        8.7<L>   16.11<H> >-----------< 94<L>    (  @ 21:16 )             24.9<L>                        7.6<L>   14.53<H> >-----------< 73<L>    (  @ 20:22 )             21.2<L>                        12.1   19.09<H> >-----------< 123<L>    (  @ 17:34 )             35.0                        14.4   10.15 >-----------< 145<L>    (  @ 05:20 )             38.9    24 @ 05:40      135  |  103  |  10  ----------------------------<  68<L>  4.1   |  23  |  0.75    24 @ 21:16      133<L>  |  99  |  9   ----------------------------<  231<H>  3.5   |  20<L>  |  0.63    24 @ 20:22      134<L>  |  105  |  6<L>  ----------------------------<  52<LL>  3.8   |  12<L>  |  0.33<L>        Ca    7.6<L>      2024 05:40  Ca    7.5<L>      2024 21:16  Ca    6.5<LL>      2024 20:22  Phos  4.7<H>       Phos  1.7<L>       Mg     1.50<L>       Mg     0.80<LL>         TPro  4.2<L>  /  Alb  2.2<L>  /  TBili  0.5  /  DBili  x   /  AST  31  /  ALT  8   /  AlkPhos  134<H>  24 @ 21:16  TPro  2.5<L>  /  Alb  1.5<L>  /  TBili  0.2  /  DBili  x   /  AST  16  /  ALT  5   /  AlkPhos  78  24 @ 20:22          Physical Exam:  General: NAD  Abdomen: soft, appropriately tender, non-distended, fundus firm  Vaginal: No heavy vaginal bleeding  Extremities: No erythema/edema

## 2024-05-01 NOTE — PROGRESS NOTE ADULT - ASSESSMENT
A/P: 32yo PPD#2 s/p  c/b retained placenta with manual removal, PPH and maternal sepsis likely secondary to chorioamnionitis and now endometritis. Was in SICU for hemodynamic monitoring in the setting of maternal sepsis and acute blood loss anemia, moved to postpartum floor yesterday. Patient is stable and doing well post-partum.    - s/p Ancef () for manual removal of placenta  - Pain well controlled, continue current pain regimen  - Increase ambulation, SCDs when not ambulating  - Continue regular diet    #PPH  - Secondary to atony, now resolved  - s/p Extra pitocin, CytoPR  - EBL: 1092  - Hct: 38.9->35.0->21.2->24.9->23.1->23.3  - Coags wnl     #Endometritis  - s/p Zosyn (-)  - WBC: 10.15->19.09->14.53->16.11->16.26->16.53  - F/u AM CBC to trend WBC ()    Sharri Denis, PGY1

## 2024-05-01 NOTE — LACTATION INITIAL EVALUATION - LACTATION INTERVENTIONS
Primary RN made aware of consult and plan./initiate/review safe skin-to-skin/initiate/review hand expression/initiate/review pumping guidelines and safe milk handling/initiate/review techniques for position and latch/post discharge community resources provided/review techniques to increase milk supply/review techniques to manage sore nipples/engorgement/reviewed components of an effective feeding and at least 8 effective feedings per day required/reviewed importance of monitoring infant diapers, the breastfeeding log, and minimum output each day/reviewed risks of artificial nipples/reviewed strategies to transition to breastfeeding only/reviewed benefits and recommendations for rooming in/reviewed feeding on demand/by cue at least 8 times a day

## 2024-05-02 VITALS
HEART RATE: 93 BPM | OXYGEN SATURATION: 100 % | RESPIRATION RATE: 18 BRPM | TEMPERATURE: 98 F | DIASTOLIC BLOOD PRESSURE: 84 MMHG | SYSTOLIC BLOOD PRESSURE: 124 MMHG

## 2024-05-02 LAB
-  CLINDAMYCIN: SIGNIFICANT CHANGE UP
-  ERYTHROMYCIN: SIGNIFICANT CHANGE UP
-  GENTAMICIN: SIGNIFICANT CHANGE UP
-  OXACILLIN: SIGNIFICANT CHANGE UP
-  PENICILLIN: SIGNIFICANT CHANGE UP
-  RIFAMPIN: SIGNIFICANT CHANGE UP
-  TETRACYCLINE: SIGNIFICANT CHANGE UP
-  TRIMETHOPRIM/SULFAMETHOXAZOLE: SIGNIFICANT CHANGE UP
-  VANCOMYCIN: SIGNIFICANT CHANGE UP
HCT VFR BLD CALC: 25.6 % — LOW (ref 34.5–45)
HGB BLD-MCNC: 8.9 G/DL — LOW (ref 11.5–15.5)
MCHC RBC-ENTMCNC: 32.5 PG — SIGNIFICANT CHANGE UP (ref 27–34)
MCHC RBC-ENTMCNC: 34.8 GM/DL — SIGNIFICANT CHANGE UP (ref 32–36)
MCV RBC AUTO: 93.4 FL — SIGNIFICANT CHANGE UP (ref 80–100)
METHOD TYPE: SIGNIFICANT CHANGE UP
NRBC # BLD: 0 /100 WBCS — SIGNIFICANT CHANGE UP (ref 0–0)
NRBC # FLD: 0 K/UL — SIGNIFICANT CHANGE UP (ref 0–0)
PLATELET # BLD AUTO: 143 K/UL — LOW (ref 150–400)
RBC # BLD: 2.74 M/UL — LOW (ref 3.8–5.2)
RBC # FLD: 15 % — HIGH (ref 10.3–14.5)
WBC # BLD: 12.4 K/UL — HIGH (ref 3.8–10.5)
WBC # FLD AUTO: 12.4 K/UL — HIGH (ref 3.8–10.5)

## 2024-05-02 RX ORDER — ACETAMINOPHEN 500 MG
3 TABLET ORAL
Qty: 0 | Refills: 0 | DISCHARGE
Start: 2024-05-02

## 2024-05-02 RX ORDER — FERROUS SULFATE 325(65) MG
1 TABLET ORAL
Qty: 0 | Refills: 0 | DISCHARGE
Start: 2024-05-02

## 2024-05-02 RX ORDER — IBUPROFEN 200 MG
1 TABLET ORAL
Qty: 0 | Refills: 0 | DISCHARGE
Start: 2024-05-02

## 2024-05-02 RX ADMIN — SODIUM CHLORIDE 3 MILLILITER(S): 9 INJECTION INTRAMUSCULAR; INTRAVENOUS; SUBCUTANEOUS at 06:00

## 2024-05-02 RX ADMIN — Medication 975 MILLIGRAM(S): at 06:45

## 2024-05-02 RX ADMIN — Medication 600 MILLIGRAM(S): at 03:00

## 2024-05-02 RX ADMIN — Medication 600 MILLIGRAM(S): at 09:23

## 2024-05-02 RX ADMIN — Medication 600 MILLIGRAM(S): at 10:00

## 2024-05-02 RX ADMIN — Medication 975 MILLIGRAM(S): at 00:00

## 2024-05-02 RX ADMIN — Medication 975 MILLIGRAM(S): at 00:45

## 2024-05-02 RX ADMIN — SENNA PLUS 1 TABLET(S): 8.6 TABLET ORAL at 00:00

## 2024-05-02 RX ADMIN — Medication 600 MILLIGRAM(S): at 03:45

## 2024-05-02 RX ADMIN — Medication 325 MILLIGRAM(S): at 06:06

## 2024-05-02 RX ADMIN — Medication 975 MILLIGRAM(S): at 06:05

## 2024-05-02 NOTE — PROGRESS NOTE ADULT - SUBJECTIVE AND OBJECTIVE BOX
OB Progress Note:  PPD#3    S: 32yo PPD#3 s/p  c/b retained placenta with manual removal, PPH and maternal sepsis likely secondary to chorioamnionitis and now endometritis. Was in SICU for hemodynamic monitoring in the setting of maternal sepsis and acute blood loss anemia, moved to postpartum floor two days ago. Yesterday received 1u PRBC transfusion for symptomatic anemia. Patient feels well today. Pain is well controlled, tolerating regular diet, passing flatus, voiding spontaneously, ambulating without difficulty. Denies heavy vaginal bleeding, CP/SOB, lightheadedness/dizziness, N/V.    O:  Vitals:   Vital Signs Last 24 Hrs  T(C): 36.4 (02 May 2024 02:15), Max: 36.8 (01 May 2024 15:00)  T(F): 97.6 (02 May 2024 02:15), Max: 98.2 (01 May 2024 15:00)  HR: 84 (02 May 2024 02:15) (84 - 108)  BP: 116/85 (02 May 2024 02:15) (96/59 - 124/81)  BP(mean): --  RR: 18 (02 May 2024 02:15) (16 - 20)  SpO2: 100% (02 May 2024 02:15) (98% - 100%)    Parameters below as of 02 May 2024 02:15  Patient On (Oxygen Delivery Method): room air        MEDICATIONS  (STANDING):  acetaminophen     Tablet .. 975 milliGRAM(s) Oral <User Schedule>  ascorbic acid 500 milliGRAM(s) Oral daily  diphtheria/tetanus/pertussis (acellular) Vaccine (Adacel) 0.5 milliLiter(s) IntraMuscular once  ferrous    sulfate 325 milliGRAM(s) Oral three times a day  ibuprofen  Tablet. 600 milliGRAM(s) Oral every 6 hours  prenatal multivitamin 1 Tablet(s) Oral daily  sodium chloride 0.9% lock flush 3 milliLiter(s) IV Push every 8 hours      MEDICATIONS  (PRN):  benzocaine 20%/menthol 0.5% Spray 1 Spray(s) Topical every 6 hours PRN for Perineal discomfort  dibucaine 1% Ointment 1 Application(s) Topical every 6 hours PRN Perineal discomfort  hydrocortisone 1% Cream 1 Application(s) Topical every 6 hours PRN Moderate Pain (4-6)  lanolin Ointment 1 Application(s) Topical every 6 hours PRN nipple soreness  magnesium hydroxide Suspension 30 milliLiter(s) Oral two times a day PRN Constipation  oxyCODONE    IR 5 milliGRAM(s) Oral once PRN Moderate to Severe Pain (4-10)  oxyCODONE    IR 5 milliGRAM(s) Oral every 3 hours PRN Moderate to Severe Pain (4-10)  pramoxine 1%/zinc 5% Cream 1 Application(s) Topical every 4 hours PRN Moderate Pain (4-6)  senna 1 Tablet(s) Oral two times a day PRN Constipation  simethicone 80 milliGRAM(s) Chew every 4 hours PRN Gas  witch hazel Pads 1 Application(s) Topical every 4 hours PRN Perineal discomfort        Labs:  Blood type: O Positive  Rubella IgG: RPR: Negative                          10.0<L>   14.41<H> >-----------< 155    (  @ 19:20 )             28.7<L>                        7.8<L>   13.17<H> >-----------< 123<L>    (  @ 06:03 )             22.3<L>                        8.4<L>   16.53<H> >-----------< 133<L>    (  @ 13:14 )             23.3<L>                        8.0<L>   16.26<H> >-----------< 100<L>    (  @ 05:40 )             23.1<L>                        8.7<L>   16.11<H> >-----------< 94<L>    (  @ 21:16 )             24.9<L>                        7.6<L>   14.53<H> >-----------< 73<L>    (  @ 20:22 )             21.2<L>                        12.1   19.09<H> >-----------< 123<L>    (  @ 17:34 )             35.0                        14.4   10.15 >-----------< 145<L>    (  @ 05:20 )             38.9    24 @ 06:03      140  |  109<H>  |  12  ----------------------------<  68<L>  3.9   |  26  |  0.69    24 @ 05:40      135  |  103  |  10  ----------------------------<  68<L>  4.1   |  23  |  0.75    24 @ 21:16      133<L>  |  99  |  9   ----------------------------<  231<H>  3.5   |  20<L>  |  0.63    24 @ 20:22      134<L>  |  105  |  6<L>  ----------------------------<  52<LL>  3.8   |  12<L>  |  0.33<L>        Ca    7.5<L>      01 May 2024 06:03  Ca    7.6<L>      2024 05:40  Ca    7.5<L>      2024 21:16  Ca    6.5<LL>      2024 20:22  Phos  4.7<H>       Phos  1.7<L>       Mg     1.50<L>       Mg     0.80<LL>         TPro  4.2<L>  /  Alb  2.2<L>  /  TBili  0.5  /  DBili  x   /  AST  31  /  ALT  8   /  AlkPhos  134<H>  24 @ 21:16  TPro  2.5<L>  /  Alb  1.5<L>  /  TBili  0.2  /  DBili  x   /  AST  16  /  ALT  5   /  AlkPhos  78  24 @ 20:22          Physical Exam:  General: NAD  Abdomen: soft, non-tender, non-distended, fundus firm  Vaginal: No heavy vaginal bleeding  Extremities: No erythema/edema, no calf tenderness

## 2024-05-02 NOTE — PROGRESS NOTE ADULT - PROVIDER SPECIALTY LIST ADULT
OB
SICU
Anesthesia
OB
OB
[Well Developed] : well developed
[Well Nourished] : well nourished
[No Acute Distress] : no acute distress
[Normal Conjunctiva] : normal conjunctiva
[Normal Venous Pressure] : normal venous pressure
[No Carotid Bruit] : no carotid bruit
[Normal S1, S2] : normal S1, S2
[No Murmur] : no murmur
[No Rub] : no rub
[No Gallop] : no gallop
[Clear Lung Fields] : clear lung fields
[Good Air Entry] : good air entry
[No Respiratory Distress] : no respiratory distress 
[Soft] : abdomen soft
[Non Tender] : non-tender
[No Masses/organomegaly] : no masses/organomegaly
[Normal Bowel Sounds] : normal bowel sounds
[Normal Gait] : normal gait
[No Edema] : no edema
[No Cyanosis] : no cyanosis
[No Clubbing] : no clubbing
[No Varicosities] : no varicosities
[No Rash] : no rash
[No Skin Lesions] : no skin lesions
[Moves all extremities] : moves all extremities
[No Focal Deficits] : no focal deficits
[Normal Speech] : normal speech
[Alert and Oriented] : alert and oriented
[Normal memory] : normal memory

## 2024-05-02 NOTE — PROGRESS NOTE ADULT - ATTENDING COMMENTS
OBGYN Attending    PPD 2 s/p  c/b retained placenta with manual removal, PPH and maternal sepsis likely secondary to chorioamnionitis and now endometritis.  s/p SICU for hemodynamic monitoring in setting of maternal sepsis, acute blood loss anemia.  s/p Zosyn for treatment of endometritis.  Afebrile >24h.  blood cultures negative at 24h.  Ucx and placental cultures pending.    Pt seen at bedside.  Agree with above.  Doing well PPD#2.  Pain controlled.  Tolerating PO.  Viding.  Ambulating.  Denies dizziness/lightheadedness, however notes mild SOB when she is walking.  Abd soft, NT.  FF, minimally tender.    Routine PP care.  Discussed transfusion 1 unit pRBCs for symptomatic acute blood loss anemia.  Pt in agreement with this plan.  Will continue to monitor for s/s infection.   Repeat CBC after transfusion to trend H/H and WBC.    ZENOBIA Dubois MD
Attending Note   Agree with above  pt reports feeling well   ambulating with no SOB/dizziness/palpations   abd soft, fundus firm nontender  Perineum healing well   PPD 3 s/p  c/w PPH, asymptomatic anemia from acute blood loss, endometritis s/p zosyn   routine pp care  FE supplementation   d/c home today   f/u in 1-2 weeks in office
I agree with the detailed interval history, physical, and plan, which I have reviewed and edited where appropriate'; also agree with notes/assessment with my team on service.  I have personally examined the patient.  I was physically present for the key portions of the evaluation and management (E/M) service provided.  I reviewed all the pertinent data.  The patient is a critical care patient with life threatening hemodynamic and metabolic instability in SICU.  The SICU team has a constant risk benefit analyzes discussion and coordinating care with the primary team and all consultants.   The patient is in SICU with the chief complaint and diagnosis mentioned in the note.   The plan will be specified in the note.  33y  Female s/p vaginal delivery w/ 1L EBL and manual delivery of non-intact placenta, tachycardic to the 170s and febrile to 38.3.  S/P RRT. SICU consulted for hemodynamic management.   AO3  Lung clear  Heart RR  Abd sofly dist    PLAN:   Neurologic:  - tylenol  -motrin  -oxy prn  Respiratory:  - room air  Cardiovascular:  - MAP >65  Gastrointestinal/Nutrition:  - Diet: regular  - simethicone prn  -Genitourinary/Renal:  - dc hyatt  Hematologic:  - SQH  Infectious Disease:  - Zosyn   Endocrine:  - Fu Glucose    Disposition: DC floor  Code: FULL CODE

## 2024-05-02 NOTE — PROGRESS NOTE ADULT - ASSESSMENT
A/P: 34yo PPD#3 s/p  c/b retained placenta with manual removal, PPH and maternal sepsis likely secondary to chorioamnionitis and now endometritis. Was in SICU for hemodynamic monitoring in the setting of maternal sepsis and acute blood loss anemia, moved to postpartum floor 2 days ago. Yesterday received 1u PRBC transfusion for symptomatic anemia. Patient is stable and doing well post-partum.    - s/p Ancef () for manual removal of placenta  - Pain well controlled, continue current pain regimen  - Increase ambulation, SCDs when not ambulating  - Continue regular diet    #PPH  - Secondary to atony, now resolved  - s/p Extra pitocin, CytoPR  - EBL: 1092  - Hct: 38.9->35.0->21.2->24.9->23.1->23.3->22.3->1uPRBC->28.7  - Coags wnl  - F/u AM CBC ()     #Endometritis  - s/p Zosyn (-)  - WBC: 10.15->19.09->14.53->16.11->16.26->16.53->13.17->14.41  - No signs of continued infection on physical exam  - F/u AM CBC to trend WBC    Sharri Ng, PGY1

## 2024-05-03 LAB — FIBRINOGEN AG PPP IA-MCNC: 411 MG/DL — SIGNIFICANT CHANGE UP (ref 206–478)

## 2024-05-05 LAB
CULTURE RESULTS: ABNORMAL
CULTURE RESULTS: SIGNIFICANT CHANGE UP
CULTURE RESULTS: SIGNIFICANT CHANGE UP
ORGANISM # SPEC MICROSCOPIC CNT: ABNORMAL
SPECIMEN SOURCE: SIGNIFICANT CHANGE UP

## 2024-06-14 LAB — SURGICAL PATHOLOGY STUDY: SIGNIFICANT CHANGE UP

## 2024-09-17 PROBLEM — K58.9 IRRITABLE BOWEL SYNDROME, UNSPECIFIED: Chronic | Status: ACTIVE | Noted: 2024-04-29

## 2024-09-17 PROBLEM — K21.9 GASTRO-ESOPHAGEAL REFLUX DISEASE WITHOUT ESOPHAGITIS: Chronic | Status: ACTIVE | Noted: 2024-04-29

## 2024-09-17 PROBLEM — K44.9 DIAPHRAGMATIC HERNIA WITHOUT OBSTRUCTION OR GANGRENE: Chronic | Status: ACTIVE | Noted: 2024-04-29

## 2024-10-31 ENCOUNTER — APPOINTMENT (OUTPATIENT)
Dept: UROGYNECOLOGY | Facility: CLINIC | Age: 34
End: 2024-10-31

## 2024-10-31 VITALS
HEART RATE: 85 BPM | HEIGHT: 59 IN | WEIGHT: 110 LBS | OXYGEN SATURATION: 98 % | SYSTOLIC BLOOD PRESSURE: 108 MMHG | DIASTOLIC BLOOD PRESSURE: 68 MMHG | BODY MASS INDEX: 22.18 KG/M2

## 2024-10-31 DIAGNOSIS — R10.10 UPPER ABDOMINAL PAIN, UNSPECIFIED: ICD-10-CM

## 2024-10-31 LAB
BILIRUB UR QL STRIP: NEGATIVE
CLARITY UR: NORMAL
COLLECTION METHOD: NORMAL
GLUCOSE UR-MCNC: NEGATIVE
HCG UR QL: 0.2 EU/DL
HGB UR QL STRIP.AUTO: NEGATIVE
KETONES UR-MCNC: NEGATIVE
LEUKOCYTE ESTERASE UR QL STRIP: NEGATIVE
NITRITE UR QL STRIP: NEGATIVE
PH UR STRIP: 6
PROT UR STRIP-MCNC: NEGATIVE
SP GR UR STRIP: 1.02

## 2024-10-31 PROCEDURE — 57160 INSERT PESSARY/OTHER DEVICE: CPT | Mod: 59

## 2024-10-31 PROCEDURE — 99203 OFFICE O/P NEW LOW 30 MIN: CPT | Mod: 25

## 2024-10-31 PROCEDURE — 81003 URINALYSIS AUTO W/O SCOPE: CPT | Mod: QW

## 2024-10-31 PROCEDURE — A4562: CPT

## 2024-11-05 DIAGNOSIS — N81.9 FEMALE GENITAL PROLAPSE, UNSPECIFIED: ICD-10-CM

## 2024-11-10 ENCOUNTER — OUTPATIENT (OUTPATIENT)
Dept: OUTPATIENT SERVICES | Facility: HOSPITAL | Age: 34
LOS: 1 days | End: 2024-11-10
Payer: COMMERCIAL

## 2024-11-10 DIAGNOSIS — R10.10 UPPER ABDOMINAL PAIN, UNSPECIFIED: ICD-10-CM

## 2024-11-10 PROCEDURE — 76700 US EXAM ABDOM COMPLETE: CPT

## 2024-12-04 ENCOUNTER — APPOINTMENT (OUTPATIENT)
Dept: UROGYNECOLOGY | Facility: CLINIC | Age: 34
End: 2024-12-04

## 2024-12-04 VITALS
RESPIRATION RATE: 14 BRPM | SYSTOLIC BLOOD PRESSURE: 110 MMHG | WEIGHT: 110 LBS | BODY MASS INDEX: 22.18 KG/M2 | HEIGHT: 59 IN | HEART RATE: 90 BPM | DIASTOLIC BLOOD PRESSURE: 73 MMHG

## 2024-12-04 DIAGNOSIS — N89.8 OTHER SPECIFIED NONINFLAMMATORY DISORDERS OF VAGINA: ICD-10-CM

## 2024-12-04 DIAGNOSIS — N81.2 INCOMPLETE UTEROVAGINAL PROLAPSE: ICD-10-CM

## 2024-12-04 PROCEDURE — 57160 INSERT PESSARY/OTHER DEVICE: CPT

## 2024-12-04 PROCEDURE — A4562: CPT

## 2025-01-14 NOTE — DISCHARGE NOTE OB - PAIN IN THE CALVES OF YOUR LEGS
For information on Fall & Injury Prevention, visit: [email or web address] OR  [email or web address] OR  https://www.cdc.gov/steadi/patient.html Statement Selected

## 2025-04-17 ENCOUNTER — APPOINTMENT (OUTPATIENT)
Dept: FAMILY MEDICINE | Facility: CLINIC | Age: 35
End: 2025-04-17